# Patient Record
Sex: FEMALE | Race: WHITE | NOT HISPANIC OR LATINO
[De-identification: names, ages, dates, MRNs, and addresses within clinical notes are randomized per-mention and may not be internally consistent; named-entity substitution may affect disease eponyms.]

---

## 2019-01-25 ENCOUNTER — RECORD ABSTRACTING (OUTPATIENT)
Age: 84
End: 2019-01-25

## 2019-01-29 ENCOUNTER — APPOINTMENT (OUTPATIENT)
Dept: GERIATRICS | Facility: CLINIC | Age: 84
End: 2019-01-29
Payer: MEDICARE

## 2019-01-29 VITALS
HEIGHT: 57 IN | OXYGEN SATURATION: 93 % | TEMPERATURE: 97.8 F | WEIGHT: 109 LBS | BODY MASS INDEX: 23.51 KG/M2 | SYSTOLIC BLOOD PRESSURE: 128 MMHG | HEART RATE: 73 BPM | DIASTOLIC BLOOD PRESSURE: 70 MMHG

## 2019-01-29 PROCEDURE — 99214 OFFICE O/P EST MOD 30 MIN: CPT

## 2019-01-29 NOTE — SOCIAL HISTORY
[No falls in past year] : Patient reported no falls in the past year [Fully functional (bathing, dressing, toileting, transferring, walking, feeding)] : Fully functional (bathing, dressing, toileting, transferring, walking, feeding) [Fully functional (using the telephone, shopping, preparing meals, housekeeping, doing laundry, using transportation,] : Fully functional and needs no help or supervision to perform IADLs (using the telephone, shopping, preparing meals, housekeeping, doing laundry, using transportation, managing medications and managing finances) [Walker] : walker [Smoke Detector] : smoke detector [Carbon Monoxide Detector] : carbon monoxide detector

## 2019-01-29 NOTE — PHYSICAL EXAM
[General Appearance - Alert] : alert [General Appearance - In No Acute Distress] : in no acute distress [Sclera] : the sclera and conjunctiva were normal [Extraocular Movements] : extraocular movements were intact [Normal Oral Mucosa] : normal oral mucosa [No Oral Pallor] : no oral pallor [No Oral Cyanosis] : no oral cyanosis [Outer Ear] : the ears and nose were normal in appearance [Oropharynx] : The oropharynx was normal [Neck Appearance] : the appearance of the neck was normal [Neck Cervical Mass (___cm)] : no neck mass was observed [Jugular Venous Distention Increased] : there was no jugular-venous distention [Thyroid Diffuse Enlargement] : the thyroid was not enlarged [Thyroid Nodule] : there were no palpable thyroid nodules [Auscultation Breath Sounds / Voice Sounds] : lungs were clear to auscultation bilaterally [Heart Rate And Rhythm] : heart rate was normal and rhythm regular [Heart Sounds] : normal S1 and S2 [Heart Sounds Gallop] : no gallops [Heart Sounds Pericardial Friction Rub] : no pericardial rub [Bowel Sounds] : normal bowel sounds [Abdomen Soft] : soft [Abdomen Tenderness] : non-tender [Abdomen Mass (___ Cm)] : no abdominal mass palpated [Abnormal Walk] : normal gait [Nail Clubbing] : no clubbing  or cyanosis of the fingernails [Musculoskeletal - Swelling] : no joint swelling seen [Motor Tone] : muscle strength and tone were normal [Skin Color & Pigmentation] : normal skin color and pigmentation [Skin Turgor] : normal skin turgor [] : no rash [Sensation] : the sensory exam was normal to light touch and pinprick [No Focal Deficits] : no focal deficits [FreeTextEntry1] : pt is completely alert

## 2019-01-29 NOTE — ASSESSMENT
[FreeTextEntry1] : repeat labs in 3 months\par renewed meds\par \par called fla - await news on pneumonia vaccine

## 2019-01-29 NOTE — HISTORY OF PRESENT ILLNESS
[1] : 2) Feeling down, depressed, or hopeless for several days [PHQ-2 Score ___] : PHQ-2 Score [unfilled] [FreeTextEntry1] : pt. [facility] home in [place]

## 2019-05-14 ENCOUNTER — RESULT REVIEW (OUTPATIENT)
Age: 84
End: 2019-05-14

## 2019-05-14 ENCOUNTER — APPOINTMENT (OUTPATIENT)
Dept: GERIATRICS | Facility: CLINIC | Age: 84
End: 2019-05-14
Payer: MEDICARE

## 2019-05-14 VITALS
HEIGHT: 57 IN | WEIGHT: 108 LBS | OXYGEN SATURATION: 96 % | BODY MASS INDEX: 23.3 KG/M2 | TEMPERATURE: 97.1 F | DIASTOLIC BLOOD PRESSURE: 78 MMHG | HEART RATE: 76 BPM | SYSTOLIC BLOOD PRESSURE: 126 MMHG

## 2019-05-14 PROCEDURE — 99214 OFFICE O/P EST MOD 30 MIN: CPT

## 2019-05-14 NOTE — ASSESSMENT
[FreeTextEntry1] : repeat labs in 3 months\par renewed meds\par \par called fla - await news on pneumonia vaccine\par \par 5/14/19\par left ear cleaned of cerumen\par check labs\par refer to pain/acupuncture drs

## 2019-05-14 NOTE — REVIEW OF SYSTEMS
[Loss Of Hearing] : hearing loss [Joint Pain] : joint pain [Arthralgias] : arthralgias [Difficulty Walking] : difficulty walking [Limb Weakness] : limb weakness [Limb Pain] : limb pain [FreeTextEntry8] : frequent urination [Negative] : Heme/Lymph

## 2019-05-14 NOTE — SOCIAL HISTORY
[Fully functional (bathing, dressing, toileting, transferring, walking, feeding)] : Fully functional (bathing, dressing, toileting, transferring, walking, feeding) [No falls in past year] : Patient reported no falls in the past year [Fully functional (using the telephone, shopping, preparing meals, housekeeping, doing laundry, using transportation,] : Fully functional and needs no help or supervision to perform IADLs (using the telephone, shopping, preparing meals, housekeeping, doing laundry, using transportation, managing medications and managing finances) [Walker] : walker

## 2019-05-14 NOTE — HISTORY OF PRESENT ILLNESS
[0] : 2) Feeling down, depressed, or hopeless: Not at all [FreeTextEntry1] : pt is 89 year old alert lady lives with daughter\par DM, htn\par pelvic fracture 4/2017\par \par some left groin pain -\par relief from tylenol\par \par reviewed all labs from october\par hba1c 5.9\par \par eats donuts\par does not check sugar\par \par 5/14/19\par pt has been well\par c/o lbp - radiates down left leg\par uses tylenol\par interested in acupuncture\par 99 on monday\par noncompliant with diet\par very alert\par \par

## 2019-05-14 NOTE — PHYSICAL EXAM
[General Appearance - In No Acute Distress] : in no acute distress [Sclera] : the sclera and conjunctiva were normal [General Appearance - Alert] : alert [No Oral Pallor] : no oral pallor [Extraocular Movements] : extraocular movements were intact [Normal Oral Mucosa] : normal oral mucosa [Outer Ear] : the ears and nose were normal in appearance [Oropharynx] : The oropharynx was normal [No Oral Cyanosis] : no oral cyanosis [Jugular Venous Distention Increased] : there was no jugular-venous distention [Neck Appearance] : the appearance of the neck was normal [Neck Cervical Mass (___cm)] : no neck mass was observed [Thyroid Diffuse Enlargement] : the thyroid was not enlarged [Thyroid Nodule] : there were no palpable thyroid nodules [Auscultation Breath Sounds / Voice Sounds] : lungs were clear to auscultation bilaterally [Heart Sounds Gallop] : no gallops [Heart Rate And Rhythm] : heart rate was normal and rhythm regular [Heart Sounds] : normal S1 and S2 [Heart Sounds Pericardial Friction Rub] : no pericardial rub [Edema] : there was no peripheral edema [Abdomen Soft] : soft [Bowel Sounds] : normal bowel sounds [Abdomen Tenderness] : non-tender [Abdomen Mass (___ Cm)] : no abdominal mass palpated [Cervical Lymph Nodes Enlarged Anterior Bilaterally] : anterior cervical [Skin Color & Pigmentation] : normal skin color and pigmentation [Involuntary Movements] : no involuntary movements were seen [] : no rash [Skin Turgor] : normal skin turgor [No Focal Deficits] : no focal deficits [FreeTextEntry1] : pt is completely alert [Sensation] : the sensory exam was normal to light touch and pinprick

## 2019-09-17 ENCOUNTER — APPOINTMENT (OUTPATIENT)
Dept: GERIATRICS | Facility: CLINIC | Age: 84
End: 2019-09-17
Payer: MEDICARE

## 2019-09-17 VITALS
BODY MASS INDEX: 23 KG/M2 | DIASTOLIC BLOOD PRESSURE: 80 MMHG | HEIGHT: 57 IN | SYSTOLIC BLOOD PRESSURE: 130 MMHG | HEART RATE: 71 BPM | WEIGHT: 106.6 LBS | OXYGEN SATURATION: 96 %

## 2019-09-17 PROCEDURE — 69210 REMOVE IMPACTED EAR WAX UNI: CPT

## 2019-09-17 PROCEDURE — 99214 OFFICE O/P EST MOD 30 MIN: CPT

## 2019-09-17 PROCEDURE — 11719 TRIM NAIL(S) ANY NUMBER: CPT

## 2019-09-17 NOTE — HISTORY OF PRESENT ILLNESS
[FreeTextEntry1] : pt is 89 year old alert lady lives with daughter\par DM, htn\par pelvic fracture 4/2017\par \par some left groin pain -\par relief from tylenol\par \par reviewed all labs from october\par hba1c 5.9\par \par eats donuts\par does not check sugar\par \par 5/14/19\par pt has been well\par c/o lbp - radiates down left leg\par uses tylenol\par interested in acupuncture\par 99 on monday\par noncompliant with diet\par very alert\par \par 9/17/19\par pain left leg radiates to lower back\par stiff, tingling hands - fingers lock\par pt wakes at 2 am tylenol worn off\par take 2 bid - urged to increase\par saw Dr Gordillo\par He ordered MR L spine\par left hip xray\par grand daughter PT

## 2019-09-17 NOTE — PHYSICAL EXAM
[General Appearance - Alert] : alert [General Appearance - In No Acute Distress] : in no acute distress [Sclera] : the sclera and conjunctiva were normal [Both Tympanic Membranes Were Examined] : both tympanic membranes were normal [Extraocular Movements] : extraocular movements were intact [Neck Appearance] : the appearance of the neck was normal [Neck Cervical Mass (___cm)] : no neck mass was observed [Jugular Venous Distention Increased] : there was no jugular-venous distention [Thyroid Diffuse Enlargement] : the thyroid was not enlarged [Thyroid Nodule] : there were no palpable thyroid nodules [Auscultation Breath Sounds / Voice Sounds] : lungs were clear to auscultation bilaterally [Heart Rate And Rhythm] : heart rate was normal and rhythm regular [Heart Sounds Gallop] : no gallops [Heart Sounds] : normal S1 and S2 [Heart Sounds Pericardial Friction Rub] : no pericardial rub [Edema] : there was no peripheral edema [Bowel Sounds] : normal bowel sounds [Abdomen Soft] : soft [Abdomen Mass (___ Cm)] : no abdominal mass palpated [Abdomen Tenderness] : non-tender [Cervical Lymph Nodes Enlarged Anterior Bilaterally] : anterior cervical [Involuntary Movements] : no involuntary movements were seen [Skin Turgor] : normal skin turgor [Skin Color & Pigmentation] : normal skin color and pigmentation [] : no rash [Sensation] : the sensory exam was normal to light touch and pinprick [No Focal Deficits] : no focal deficits [FreeTextEntry1] : using walker, OA hands, antalgiic gait

## 2019-09-17 NOTE — REVIEW OF SYSTEMS
[Loss Of Hearing] : hearing loss [Arthralgias] : arthralgias [Joint Pain] : joint pain [Difficulty Walking] : difficulty walking [Negative] : Cardiovascular

## 2019-09-17 NOTE — ASSESSMENT
[FreeTextEntry1] : repeat labs in 3 months\par renewed meds\par \par called fla - await news on pneumonia vaccine\par \par 5/14/19\par left ear cleaned of cerumen\par check labs\par refer to pain/acupuncture drs\par \par 9/17/19\par pt going for MRI spine and hip films today\par from Dr Fernandez\par encouraged increase tylenol to 1000 mg tid\par labs fine in may - no need to repeat today\par hand exercises\par lives with grand daughter - a PT\par clipped toe nails\par cleaned ears\par gave flu vaccine

## 2019-11-25 ENCOUNTER — APPOINTMENT (OUTPATIENT)
Dept: GERIATRICS | Facility: CLINIC | Age: 84
End: 2019-11-25
Payer: MEDICARE

## 2019-11-25 ENCOUNTER — RESULT REVIEW (OUTPATIENT)
Age: 84
End: 2019-11-25

## 2019-11-25 VITALS — TEMPERATURE: 97.9 F | BODY MASS INDEX: 22.07 KG/M2 | OXYGEN SATURATION: 93 % | WEIGHT: 102 LBS | HEART RATE: 78 BPM

## 2019-11-25 VITALS — DIASTOLIC BLOOD PRESSURE: 68 MMHG | SYSTOLIC BLOOD PRESSURE: 130 MMHG

## 2019-11-25 PROCEDURE — 99214 OFFICE O/P EST MOD 30 MIN: CPT

## 2019-11-25 RX ORDER — ACETAMINOPHEN 500 MG/1
500 TABLET, COATED ORAL TWICE DAILY
Refills: 0 | Status: ACTIVE | COMMUNITY
Start: 2019-11-25

## 2019-11-25 NOTE — HEALTH RISK ASSESSMENT
[Intercurrent Urgi Care visits] : went to urgent care [No] : In the past 12 months have you used drugs other than those required for medical reasons? No [One fall no injury in past year] : Patient reported one fall in the past year without injury [0] : 2) Feeling down, depressed, or hopeless: Not at all (0) [With Family] : lives with family [[id number]] : 0 [] : No [de-identified] : Needs assistance [de-identified] : Needs assistance

## 2019-11-25 NOTE — PHYSICAL EXAM
[No Acute Distress] : no acute distress [Well-Appearing] : well-appearing [Normal Sclera/Conjunctiva] : normal sclera/conjunctiva [PERRL] : pupils equal round and reactive to light [EOMI] : extraocular movements intact [Normal Outer Ear/Nose] : the outer ears and nose were normal in appearance [Normal Oropharynx] : the oropharynx was normal [No JVD] : no jugular venous distention [No Lymphadenopathy] : no lymphadenopathy [Supple] : supple [No Respiratory Distress] : no respiratory distress  [No Accessory Muscle Use] : no accessory muscle use [Clear to Auscultation] : lungs were clear to auscultation bilaterally [Normal Rate] : normal rate  [Regular Rhythm] : with a regular rhythm [Normal S1, S2] : normal S1 and S2 [No Abdominal Bruit] : a ~M bruit was not heard ~T in the abdomen [No Edema] : there was no peripheral edema [Soft] : abdomen soft [Non Tender] : non-tender [Non-distended] : non-distended [Normal Posterior Cervical Nodes] : no posterior cervical lymphadenopathy [Normal Bowel Sounds] : normal bowel sounds [No Spinal Tenderness] : no spinal tenderness [Normal Anterior Cervical Nodes] : no anterior cervical lymphadenopathy [No CVA Tenderness] : no CVA  tenderness [No Joint Swelling] : no joint swelling [No Focal Deficits] : no focal deficits [Coordination Grossly Intact] : coordination grossly intact [No Rash] : no rash [Normal Affect] : the affect was normal [Speech Grossly Normal] : speech grossly normal [Normal Mood] : the mood was normal [de-identified] : Poor dentition, Left TM unable to visualize secondary to cerumen, right TM normal [de-identified] : 3/6 murmur

## 2019-11-25 NOTE — ASSESSMENT
[FreeTextEntry1] : Fluttering sensation of heart: Regular rhythm with no ectopic beats on exam today.  Referral for cardiology for zio patch.  Check labs to r/o electrolyte abnormalities and thyroid dysfunction.  \par \par HTN: BP acceptable today, continue with metoprolol ER 25 mg daily.\par \par DM: Patient does not check BG at home.  She eats small quantities, advised grand daughter to check BG if episodes reoccur as she is on glipizide 5 mg daily.  Last Cr 1.1 / GFR 46, can consider metformin for safety.  \par \par Follow up as scheduled with PCP.

## 2019-11-25 NOTE — ADDENDUM
[FreeTextEntry1] : 1955 Labs returned demonstrating Hgb 7.7 and mild SILVANO.  Spoke with grand daughter, advised to bring to the ED for further evaluation and treatment.

## 2019-11-25 NOTE — HISTORY OF PRESENT ILLNESS
[Discharge Summary] : discharge summary [Radiology Findings] : radiology findings [Med Reconciliation] : medication reconciliation has been completed [Discharge Med List] : discharge medication list [FreeTextEntry2] : 89 year old female with a history of HTN, HLD, T2DM who presents today for a post discharge visit.  She is accompanied by he grandchildren.  \par \par Last week patient complained of having a funny sensation / fluttering heartbeat in her chest on Wed.  Thursday this once again occurred however patient fell during this.  There was no LOC.  She was taken to [facility] for evaluation.  XR or left hip and knee obtained, negative for any fractures.  Patient denies any chest pressure, SOB, dizziness or syncope when these episodes occurred.  \par

## 2019-11-25 NOTE — REVIEW OF SYSTEMS
[Hearing Loss] : hearing loss [Palpitations] : palpitations [Back Pain] : back pain [Negative] : Heme/Lymph [Chest Pain] : no chest pain [Lower Ext Edema] : no lower extremity edema [Shortness Of Breath] : no shortness of breath [Dyspnea on Exertion] : no dyspnea on exertion [Joint Pain] : no joint pain

## 2019-12-03 ENCOUNTER — APPOINTMENT (OUTPATIENT)
Dept: GERIATRICS | Facility: CLINIC | Age: 84
End: 2019-12-03
Payer: MEDICARE

## 2019-12-03 ENCOUNTER — RESULT REVIEW (OUTPATIENT)
Age: 84
End: 2019-12-03

## 2019-12-03 VITALS — DIASTOLIC BLOOD PRESSURE: 60 MMHG | SYSTOLIC BLOOD PRESSURE: 130 MMHG

## 2019-12-03 VITALS
SYSTOLIC BLOOD PRESSURE: 170 MMHG | HEART RATE: 63 BPM | OXYGEN SATURATION: 95 % | WEIGHT: 104 LBS | TEMPERATURE: 97.9 F | BODY MASS INDEX: 22.51 KG/M2 | DIASTOLIC BLOOD PRESSURE: 70 MMHG

## 2019-12-03 PROCEDURE — 99214 OFFICE O/P EST MOD 30 MIN: CPT

## 2019-12-03 RX ORDER — GLIPIZIDE 5 MG/1
5 TABLET ORAL DAILY
Qty: 90 | Refills: 3 | Status: DISCONTINUED | COMMUNITY
End: 2019-12-03

## 2019-12-03 RX ORDER — PANTOPRAZOLE 40 MG/1
40 TABLET, DELAYED RELEASE ORAL DAILY
Qty: 90 | Refills: 3 | Status: DISCONTINUED | COMMUNITY
Start: 2019-12-03 | End: 2019-12-03

## 2019-12-03 RX ORDER — ASPIRIN 81 MG
81 TABLET, DELAYED RELEASE (ENTERIC COATED) ORAL DAILY
Refills: 0 | Status: DISCONTINUED | COMMUNITY
End: 2019-12-03

## 2019-12-03 NOTE — PHYSICAL EXAM
[General Appearance - Alert] : alert [General Appearance - In No Acute Distress] : in no acute distress [Auscultation Breath Sounds / Voice Sounds] : lungs were clear to auscultation bilaterally [Heart Sounds] : normal S1 and S2 [Heart Rate And Rhythm] : heart rate was normal and rhythm regular [Heart Sounds Gallop] : no gallops [Bowel Sounds] : normal bowel sounds [Heart Sounds Pericardial Friction Rub] : no pericardial rub [Abdomen Soft] : soft [Abdomen Tenderness] : non-tender [Abdomen Mass (___ Cm)] : no abdominal mass palpated [Skin Color & Pigmentation] : normal skin color and pigmentation [Involuntary Movements] : no involuntary movements were seen [Skin Turgor] : normal skin turgor [Sensation] : the sensory exam was normal to light touch and pinprick [] : no rash [No Focal Deficits] : no focal deficits [FreeTextEntry1] : pt is completely alert

## 2019-12-03 NOTE — ASSESSMENT
[FreeTextEntry1] : repeat labs in 3 months\par renewed meds\par \par called fla - await news on pneumonia vaccine\par \par 5/14/19\par left ear cleaned of cerumen\par check labs\par refer to pain/acupuncture drs\par \par 9/17/19\par pt going for MRI spine and hip films today\par from Dr Fernandez\par encouraged increase tylenol to 1000 mg tid\par labs fine in may - no need to repeat today\par hand exercises\par lives with grand daughter - a PT\par clipped toe nails\par cleaned ears\par gave flu vaccine\par \par 12/3/19\par avoid nsaids\par repeat labs\par cont pantaprozole\par f/up 2 weeks

## 2019-12-03 NOTE — SOCIAL HISTORY
[No falls in past year] : Patient reported no falls in the past year [Fully functional (using the telephone, shopping, preparing meals, housekeeping, doing laundry, using transportation,] : Fully functional and needs no help or supervision to perform IADLs (using the telephone, shopping, preparing meals, housekeeping, doing laundry, using transportation, managing medications and managing finances) [Fully functional (bathing, dressing, toileting, transferring, walking, feeding)] : Fully functional (bathing, dressing, toileting, transferring, walking, feeding) [Smoke Detector] : smoke detector [Carbon Monoxide Detector] : carbon monoxide detector [Driving] : not driving

## 2019-12-03 NOTE — HISTORY OF PRESENT ILLNESS
[PHQ-2 Score ___] : PHQ-2 Score [unfilled] [0] : 2) Feeling down, depressed, or hopeless: Not at all [FreeTextEntry1] : pt is 89 year old alert lady lives with daughter\par DM, htn\par pelvic fracture 4/2017\par \par some left groin pain -\par relief from tylenol\par \par reviewed all labs from october\par hba1c 5.9\par \par eats donuts\par does not check sugar\par \par 5/14/19\par pt has been well\par c/o lbp - radiates down left leg\par uses tylenol\par interested in acupuncture\par 99 on monday\par noncompliant with diet\par very alert\par \par 9/17/19\par pain left leg radiates to lower back\par stiff, tingling hands - fingers lock\par pt wakes at 2 am tylenol worn off\par take 2 bid - urged to increase\par saw Dr Gordillo\par He ordered MR L spine\par left hip xray\par grand daughter PT\par \par 12/3/19\par pt had been feeling unwell - had some chest pain\par stumbled ?fell - went to urgent care - then came here to f/up\par came in for urgent appt - found to have hb 7.7\par came to ER - got a unit of blood - blood in stool\par prob UGI bleed\par ? re endoscopy \par did not have it done\par gave her pantaprozole\par \par off glipizde and asa\par \par no abd pain\par one black stool\par now they are brown again\par no more chest pain\par

## 2019-12-17 ENCOUNTER — RESULT REVIEW (OUTPATIENT)
Age: 84
End: 2019-12-17

## 2019-12-17 ENCOUNTER — APPOINTMENT (OUTPATIENT)
Dept: GERIATRICS | Facility: CLINIC | Age: 84
End: 2019-12-17
Payer: MEDICARE

## 2019-12-17 VITALS
SYSTOLIC BLOOD PRESSURE: 140 MMHG | TEMPERATURE: 97.4 F | WEIGHT: 103 LBS | BODY MASS INDEX: 22.22 KG/M2 | DIASTOLIC BLOOD PRESSURE: 90 MMHG | HEART RATE: 64 BPM | HEIGHT: 57 IN

## 2019-12-17 PROCEDURE — 99214 OFFICE O/P EST MOD 30 MIN: CPT

## 2019-12-17 NOTE — HISTORY OF PRESENT ILLNESS
[0] : 2) Feeling down, depressed, or hopeless: Not at all [PHQ-2 Score ___] : PHQ-2 Score [unfilled] [FreeTextEntry1] : pt is 89 year old alert lady lives with daughter\par DM, htn\par pelvic fracture 4/2017\par \par some left groin pain -\par relief from tylenol\par \par reviewed all labs from october\par hba1c 5.9\par \par eats donuts\par does not check sugar\par \par 5/14/19\par pt has been well\par c/o lbp - radiates down left leg\par uses tylenol\par interested in acupuncture\par 99 on monday\par noncompliant with diet\par very alert\par \par 9/17/19\par pain left leg radiates to lower back\par stiff, tingling hands - fingers lock\par pt wakes at 2 am tylenol worn off\par take 2 bid - urged to increase\par saw Dr Gordillo\par He ordered MR L spine\par left hip xray\par grand daughter PT\par \par 12/3/19\par pt had been feeling unwell - had some chest pain\par stumbled ?fell - went to urgent care - then came here to f/up\par came in for urgent appt - found to have hb 7.7\par came to ER - got a unit of blood - blood in stool\par prob UGI bleed\par ? re endoscopy \par did not have it done\par gave her pantaprozole\par \par off glipizde and asa\par \par no abd pain\par one black stool\par now they are brown again\par no more chest pain\par \par 12/17/19\par knee and back pain\par no bleeding\par got PT\par \par hit corner of dressor to rt ribs\par

## 2019-12-17 NOTE — ASSESSMENT
[FreeTextEntry1] : repeat labs in 3 months\par renewed meds\par \par called fla - await news on pneumonia vaccine\par \par 5/14/19\par left ear cleaned of cerumen\par check labs\par refer to pain/acupuncture drs\par \par 9/17/19\par pt going for MRI spine and hip films today\par from Dr Fernandez\par encouraged increase tylenol to 1000 mg tid\par labs fine in may - no need to repeat today\par hand exercises\par lives with grand daughter - a PT\par clipped toe nails\par cleaned ears\par gave flu vaccine\par \par 12/3/19\par avoid nsaids\par repeat labs\par cont pantaprozole\par f/up 2 weeks\par \par 12/17/19\par cont off DM meds\par recheck labs\par f.up feb if stable

## 2019-12-17 NOTE — REVIEW OF SYSTEMS
[Arthralgias] : arthralgias [Loss Of Hearing] : hearing loss [Negative] : Psychiatric [FreeTextEntry3] : missing teeth [FreeTextEntry7] : no bloody or black stool

## 2019-12-17 NOTE — SOCIAL HISTORY
[No falls in past year] : Patient reported no falls in the past year [Fully functional (bathing, dressing, toileting, transferring, walking, feeding)] : Fully functional (bathing, dressing, toileting, transferring, walking, feeding) [Some assistance needed] : preparing meals [Full assistance needed] : managing finances [Smoke Detector] : smoke detector [Safety elements used in home] : safety elements used in home [Carbon Monoxide Detector] : carbon monoxide detector [Grab Bars] : no grab bars [Shower Chair] : no shower chair [Anti-Slip Measures] : no anti-slip measures [Driving] : not driving [Night Light] : no night light

## 2019-12-17 NOTE — PHYSICAL EXAM
[General Appearance - Alert] : alert [General Appearance - In No Acute Distress] : in no acute distress [General Appearance - Well Nourished] : well nourished [General Appearance - Well-Appearing] : healthy appearing [General Appearance - Well Developed] : well developed [Sclera] : the sclera and conjunctiva were normal [Extraocular Movements] : extraocular movements were intact [Normal Oral Mucosa] : normal oral mucosa [No Oral Cyanosis] : no oral cyanosis [No Oral Pallor] : no oral pallor [Outer Ear] : the ears and nose were normal in appearance [Oropharynx] : The oropharynx was normal [Neck Appearance] : the appearance of the neck was normal [Neck Cervical Mass (___cm)] : no neck mass was observed [Jugular Venous Distention Increased] : there was no jugular-venous distention [] : no respiratory distress [Auscultation Breath Sounds / Voice Sounds] : lungs were clear to auscultation bilaterally [Heart Sounds] : normal S1 and S2 [Heart Sounds Gallop] : no gallops [Heart Rate And Rhythm] : heart rate was normal and rhythm regular [Heart Sounds Pericardial Friction Rub] : no pericardial rub [FreeTextEntry1] : trace LE edema

## 2020-02-25 ENCOUNTER — RESULT REVIEW (OUTPATIENT)
Age: 85
End: 2020-02-25

## 2020-02-25 ENCOUNTER — APPOINTMENT (OUTPATIENT)
Dept: GERIATRICS | Facility: CLINIC | Age: 85
End: 2020-02-25
Payer: MEDICARE

## 2020-02-25 VITALS
HEART RATE: 83 BPM | DIASTOLIC BLOOD PRESSURE: 90 MMHG | SYSTOLIC BLOOD PRESSURE: 150 MMHG | TEMPERATURE: 98.2 F | BODY MASS INDEX: 21.86 KG/M2 | WEIGHT: 101 LBS | OXYGEN SATURATION: 95 %

## 2020-02-25 DIAGNOSIS — Z78.9 OTHER SPECIFIED HEALTH STATUS: ICD-10-CM

## 2020-02-25 PROCEDURE — 99214 OFFICE O/P EST MOD 30 MIN: CPT

## 2020-02-25 NOTE — ASSESSMENT
[FreeTextEntry1] : repeat labs in 3 months\par renewed meds\par \par called fla - await news on pneumonia vaccine\par \par 5/14/19\par left ear cleaned of cerumen\par check labs\par refer to pain/acupuncture drs\par \par 9/17/19\par pt going for MRI spine and hip films today\par from Dr Fernandez\par encouraged increase tylenol to 1000 mg tid\par labs fine in may - no need to repeat today\par hand exercises\par lives with grand daughter - a PT\par clipped toe nails\par cleaned ears\par gave flu vaccine\par \par 12/3/19\par avoid nsaids\par repeat labs\par cont pantaprozole\par f/up 2 weeks\par \par 12/17/19\par cont off DM meds\par recheck labs\par f.up feb if stable\par \par 2/25/20\par checking labs today\par pain management for back

## 2020-02-25 NOTE — HISTORY OF PRESENT ILLNESS
[0] : 1) Little interest or pleasure doing things: Not at all [2] : 2) Feeling down, depressed, or hopeless for more than half of the days [FreeTextEntry1] : pt is 89 year old alert lady lives with daughter\par DM, htn\par pelvic fracture 4/2017\par \par some left groin pain -\par relief from tylenol\par \par reviewed all labs from october\par hba1c 5.9\par \par eats donuts\par does not check sugar\par \par 5/14/19\par pt has been well\par c/o lbp - radiates down left leg\par uses tylenol\par interested in acupuncture\par 99 on monday\par noncompliant with diet\par very alert\par \par 9/17/19\par pain left leg radiates to lower back\par stiff, tingling hands - fingers lock\par pt wakes at 2 am tylenol worn off\par take 2 bid - urged to increase\par saw Dr Gordillo\par He ordered MR L spine\par left hip xray\par grand daughter PT\par \par 12/3/19\par pt had been feeling unwell - had some chest pain\par stumbled ?fell - went to urgent care - then came here to f/up\par came in for urgent appt - found to have hb 7.7\par came to ER - got a unit of blood - blood in stool\par prob UGI bleed\par ? re endoscopy \par did not have it done\par gave her pantaprozole\par \par off glipizde and asa\par \par no abd pain\par one black stool\par now they are brown again\par no more chest pain\par \par 12/17/19\par knee and back pain\par no bleeding\par got PT\par \par hit corner of dressor to rt ribs\par \par 2/25/20\par takes ulcer meds\par can have tomato sauce if  no pain\par off DM meds\par no blood in stool\par left leg hurts\par low back pain

## 2020-02-25 NOTE — PHYSICAL EXAM
[General Appearance - Alert] : alert [General Appearance - In No Acute Distress] : in no acute distress [General Appearance - Well Nourished] : well nourished [General Appearance - Well Developed] : well developed [Sclera] : the sclera and conjunctiva were normal [General Appearance - Well-Appearing] : healthy appearing [Extraocular Movements] : extraocular movements were intact [Normal Oral Mucosa] : normal oral mucosa [No Oral Pallor] : no oral pallor [No Oral Cyanosis] : no oral cyanosis [Outer Ear] : the ears and nose were normal in appearance [Neck Appearance] : the appearance of the neck was normal [Oropharynx] : The oropharynx was normal [Neck Cervical Mass (___cm)] : no neck mass was observed [Jugular Venous Distention Increased] : there was no jugular-venous distention [Auscultation Breath Sounds / Voice Sounds] : lungs were clear to auscultation bilaterally [Heart Rate And Rhythm] : heart rate was normal and rhythm regular [Heart Sounds] : normal S1 and S2 [Heart Sounds Gallop] : no gallops [Heart Sounds Pericardial Friction Rub] : no pericardial rub [Bowel Sounds] : normal bowel sounds [Abdomen Soft] : soft [Abdomen Tenderness] : non-tender [Abdomen Mass (___ Cm)] : no abdominal mass palpated [Involuntary Movements] : no involuntary movements were seen [Cervical Lymph Nodes Enlarged Anterior Bilaterally] : anterior cervical [FreeTextEntry1] : using walker, OA hands, antalgiic gait - in wheel chair [Skin Turgor] : normal skin turgor [Skin Color & Pigmentation] : normal skin color and pigmentation [] : no rash

## 2020-02-25 NOTE — SOCIAL HISTORY
[Any fall with injury in past year] : Patient reported fall with injury in the past year [de-identified] : pt. fell on 02/23/2020 and hit her head on the shower door. pt stated "it did not hurt"

## 2020-02-27 ENCOUNTER — RX RENEWAL (OUTPATIENT)
Age: 85
End: 2020-02-27

## 2020-04-28 ENCOUNTER — APPOINTMENT (OUTPATIENT)
Dept: GERIATRICS | Facility: CLINIC | Age: 85
End: 2020-04-28
Payer: MEDICARE

## 2020-04-28 PROCEDURE — 99447 NTRPROF PH1/NTRNET/EHR 11-20: CPT

## 2020-04-28 NOTE — HISTORY OF PRESENT ILLNESS
[FreeTextEntry1] : pt is 89 year old alert lady lives with daughter\par DM, htn\par pelvic fracture 4/2017\par \par some left groin pain -\par relief from tylenol\par \par reviewed all labs from october\par hba1c 5.9\par \par eats donuts\par does not check sugar\par \par 5/14/19\par pt has been well\par c/o lbp - radiates down left leg\par uses tylenol\par interested in acupuncture\par 99 on monday\par noncompliant with diet\par very alert\par \par 9/17/19\par pain left leg radiates to lower back\par stiff, tingling hands - fingers lock\par pt wakes at 2 am tylenol worn off\par take 2 bid - urged to increase\par saw Dr Gordillo\par He ordered MR L spine\par left hip xray\par grand daughter PT\par \par 12/3/19\par pt had been feeling unwell - had some chest pain\par stumbled ?fell - went to urgent care - then came here to f/up\par came in for urgent appt - found to have hb 7.7\par came to ER - got a unit of blood - blood in stool\par prob UGI bleed\par ? re endoscopy \par did not have it done\par gave her pantaprozole\par \par off glipizde and asa\par \par no abd pain\par one black stool\par now they are brown again\par no more chest pain\par \par 12/17/19\par knee and back pain\par no bleeding\par got PT\par \par hit corner of dressor to rt ribs\par \par 2/25/20\par takes ulcer meds\par can have tomato sauce if  no pain\par off DM meds\par no blood in stool\par left leg hurts\par low back pain\par \par 4/28/20\par \par called [phone]\par back/hip pains\par in in [facility] with family bc of covid\par likes cakes\par \par one day was weak - fsg - after eating donut - 184\par paramedics checked her and she was ok\par \par BP checked once - will do again and send me

## 2020-07-01 ENCOUNTER — RX RENEWAL (OUTPATIENT)
Age: 85
End: 2020-07-01

## 2020-10-05 ENCOUNTER — RX RENEWAL (OUTPATIENT)
Age: 85
End: 2020-10-05

## 2020-10-15 ENCOUNTER — RX RENEWAL (OUTPATIENT)
Age: 85
End: 2020-10-15

## 2020-12-29 ENCOUNTER — RX RENEWAL (OUTPATIENT)
Age: 85
End: 2020-12-29

## 2021-05-18 ENCOUNTER — APPOINTMENT (OUTPATIENT)
Dept: GERIATRICS | Facility: CLINIC | Age: 86
End: 2021-05-18
Payer: MEDICARE

## 2021-05-18 VITALS
OXYGEN SATURATION: 98 % | WEIGHT: 95 LBS | DIASTOLIC BLOOD PRESSURE: 70 MMHG | BODY MASS INDEX: 20.56 KG/M2 | HEART RATE: 62 BPM | TEMPERATURE: 98 F | SYSTOLIC BLOOD PRESSURE: 130 MMHG

## 2021-05-18 VITALS — SYSTOLIC BLOOD PRESSURE: 150 MMHG | DIASTOLIC BLOOD PRESSURE: 80 MMHG

## 2021-05-18 PROCEDURE — 99214 OFFICE O/P EST MOD 30 MIN: CPT

## 2021-05-18 NOTE — HISTORY OF PRESENT ILLNESS
[0] : 0 [Compliant with medications] : compliant with medications [Needs some help with ADLs] : need some help with ADLs [Does not drive] : does not drive [No history of falls] : no history of falls [Smoke Detectors] : smoke detectors [Carbon Monoxide Detector] : carbon monoxide detector [Over the Past 2 Weeks, Have You Felt Down, Depressed, or Hopeless?] : 1.) Over the past 2 weeks, have you felt down, depressed, or hopeless? No [Over the Past 2 Weeks, Have You Felt Little Interest or Pleasure Doing Things?] : 2.) Over the past 2 weeks, have you felt little interest or pleasure doing things? No [Bathroom Grab Bars] : not using bathroom grab bars [Fall Prevention Measures] : not using fall prevention measures [FreeTextEntry1] : pt is 89 year old alert lady lives with daughter\par DM, htn\par pelvic fracture 4/2017\par \par some left groin pain -\par relief from tylenol\par \par reviewed all labs from october\par hba1c 5.9\par \par eats donuts\par does not check sugar\par \par 5/14/19\par pt has been well\par c/o lbp - radiates down left leg\par uses tylenol\par interested in acupuncture\par 99 on monday\par noncompliant with diet\par very alert\par \par 9/17/19\par pain left leg radiates to lower back\par stiff, tingling hands - fingers lock\par pt wakes at 2 am tylenol worn off\par take 2 bid - urged to increase\par saw Dr Gordillo\par He ordered MR L spine\par left hip xray\par grand daughter PT\par \par 12/3/19\par pt had been feeling unwell - had some chest pain\par stumbled ?fell - went to urgent care - then came here to f/up\par came in for urgent appt - found to have hb 7.7\par came to ER - got a unit of blood - blood in stool\par prob UGI bleed\par ? re endoscopy \par did not have it done\par gave her pantaprozole\par \par off glipizde and asa\par \par no abd pain\par one black stool\par now they are brown again\par no more chest pain\par \par 12/17/19\par knee and back pain\par no bleeding\par got PT\par \par hit corner of dressor to rt ribs\par \par 2/25/20\par takes ulcer meds\par can have tomato sauce if  no pain\par off DM meds\par no blood in stool\par left leg hurts\par low back pain\par \par 4/28/20\par \par called [phone]\par back/hip pains\par in in [facility] with family bc of covid\par likes cakes\par \par one day was weak - fsg - after eating donut - 184\par paramedics checked her and she was ok\par \par BP checked once - will do again and send me \par \par 5/18/21\par pt has been in [place] during covid\par first visit in a year\par vaccinated against covid in april \par was in ER 2 weeks ago in [place] - dehydrated - got bolus fluids\par

## 2021-05-18 NOTE — REVIEW OF SYSTEMS
[Loss Of Hearing] : hearing loss [Lower Ext Edema] : lower extremity edema [Confused] : confusion [Limb Weakness] : limb weakness [Difficulty Walking] : difficulty walking [Negative] : Integumentary [Chest Pain] : no chest pain [Palpitations] : no palpitations [FreeTextEntry3] : eyes hurt [FreeTextEntry5] : trace

## 2021-05-18 NOTE — PHYSICAL EXAM
[General Appearance - Alert] : alert [General Appearance - Well Nourished] : well nourished [General Appearance - In No Acute Distress] : in no acute distress [General Appearance - Well Developed] : well developed [General Appearance - Well-Appearing] : healthy appearing [Sclera] : the sclera and conjunctiva were normal [Extraocular Movements] : extraocular movements were intact [Normal Oral Mucosa] : normal oral mucosa [No Oral Pallor] : no oral pallor [No Oral Cyanosis] : no oral cyanosis [Outer Ear] : the ears and nose were normal in appearance [Oropharynx] : The oropharynx was normal [Neck Appearance] : the appearance of the neck was normal [Neck Cervical Mass (___cm)] : no neck mass was observed [Jugular Venous Distention Increased] : there was no jugular-venous distention [Auscultation Breath Sounds / Voice Sounds] : lungs were clear to auscultation bilaterally [Heart Sounds] : normal S1 and S2 [Heart Rate And Rhythm] : heart rate was normal and rhythm regular [Heart Sounds Gallop] : no gallops [Heart Sounds Pericardial Friction Rub] : no pericardial rub [Bowel Sounds] : normal bowel sounds [Abdomen Soft] : soft [Abdomen Tenderness] : non-tender [Abdomen Mass (___ Cm)] : no abdominal mass palpated [Cervical Lymph Nodes Enlarged Anterior Bilaterally] : anterior cervical [No CVA Tenderness] : no ~M costovertebral angle tenderness [No Spinal Tenderness] : no spinal tenderness [Involuntary Movements] : no involuntary movements were seen [Skin Color & Pigmentation] : normal skin color and pigmentation [Skin Turgor] : normal skin turgor [] : no rash [Sensation] : the sensory exam was normal to light touch and pinprick [No Focal Deficits] : no focal deficits [FreeTextEntry1] : pt is completely alert

## 2021-05-18 NOTE — ASSESSMENT
[FreeTextEntry1] : repeat labs in 3 months\par renewed meds\par \par called fla - await news on pneumonia vaccine\par \par 5/14/19\par left ear cleaned of cerumen\par check labs\par refer to pain/acupuncture drs\par \par 9/17/19\par pt going for MRI spine and hip films today\par from Dr Fernandez\par encouraged increase tylenol to 1000 mg tid\par labs fine in may - no need to repeat today\par hand exercises\par lives with grand daughter - a PT\par clipped toe nails\par cleaned ears\par gave flu vaccine\par \par 12/3/19\par avoid nsaids\par repeat labs\par cont pantaprozole\par f/up 2 weeks\par \par 12/17/19\par cont off DM meds\par recheck labs\par f.up feb if stable\par \par 2/25/20\par checking labs today\par pain management for back\par \par 5/18/21\par pt had episode of weakness - two weeks ago\par responded to IV fluids\par pt has loud systolic mumur - which is louder than last visit\par with sinus arrythmia\par will arrange cardiac eval

## 2021-05-24 ENCOUNTER — NON-APPOINTMENT (OUTPATIENT)
Age: 86
End: 2021-05-24

## 2021-05-24 DIAGNOSIS — K08.9 DISORDER OF TEETH AND SUPPORTING STRUCTURES, UNSPECIFIED: ICD-10-CM

## 2021-05-24 DIAGNOSIS — K92.2 GASTROINTESTINAL HEMORRHAGE, UNSPECIFIED: ICD-10-CM

## 2021-05-25 ENCOUNTER — APPOINTMENT (OUTPATIENT)
Dept: CARDIOLOGY | Facility: CLINIC | Age: 86
End: 2021-05-25
Payer: MEDICARE

## 2021-05-25 ENCOUNTER — NON-APPOINTMENT (OUTPATIENT)
Age: 86
End: 2021-05-25

## 2021-05-25 VITALS
WEIGHT: 93 LBS | HEIGHT: 57 IN | BODY MASS INDEX: 20.06 KG/M2 | DIASTOLIC BLOOD PRESSURE: 75 MMHG | HEART RATE: 66 BPM | SYSTOLIC BLOOD PRESSURE: 130 MMHG

## 2021-05-25 DIAGNOSIS — M16.10 UNILATERAL PRIMARY OSTEOARTHRITIS, UNSPECIFIED HIP: ICD-10-CM

## 2021-05-25 PROCEDURE — 93000 ELECTROCARDIOGRAM COMPLETE: CPT

## 2021-05-25 PROCEDURE — 99213 OFFICE O/P EST LOW 20 MIN: CPT

## 2021-05-25 NOTE — HISTORY OF PRESENT ILLNESS
[FreeTextEntry1] : This 89 year old female is referred by Dr Townsend for a heart murmur.\par She was under the care of Dr De Leon in [place], she does not know the reason\par \par .She was recently hospitalized in [place] May 2021  for increased confusion,  EMS could not find a blood pressure but in the er she was found to be hypertensive ( felt to be chronic) and dehydrated. A murmur was noted. She was monitored overnight. \par She denies chest pain, dyspnea, palpitations or syncope.\par She walks with a walker, transports in a wheelchair.\par

## 2021-06-17 ENCOUNTER — RX RENEWAL (OUTPATIENT)
Age: 86
End: 2021-06-17

## 2021-07-27 ENCOUNTER — APPOINTMENT (OUTPATIENT)
Dept: GERIATRICS | Facility: CLINIC | Age: 86
End: 2021-07-27
Payer: MEDICARE

## 2021-07-27 VITALS — SYSTOLIC BLOOD PRESSURE: 148 MMHG | HEART RATE: 68 BPM | OXYGEN SATURATION: 94 % | DIASTOLIC BLOOD PRESSURE: 72 MMHG

## 2021-07-27 VITALS — BODY MASS INDEX: 19.95 KG/M2 | WEIGHT: 92.2 LBS

## 2021-07-27 DIAGNOSIS — R01.1 CARDIAC MURMUR, UNSPECIFIED: ICD-10-CM

## 2021-07-27 PROCEDURE — 99213 OFFICE O/P EST LOW 20 MIN: CPT

## 2021-07-27 NOTE — PHYSICAL EXAM
[General Appearance - Alert] : alert [General Appearance - In No Acute Distress] : in no acute distress [General Appearance - Well Nourished] : well nourished [General Appearance - Well Developed] : well developed [General Appearance - Well-Appearing] : healthy appearing [Sclera] : the sclera and conjunctiva were normal [Extraocular Movements] : extraocular movements were intact [Normal Oral Mucosa] : normal oral mucosa [No Oral Pallor] : no oral pallor [No Oral Cyanosis] : no oral cyanosis [Outer Ear] : the ears and nose were normal in appearance [Oropharynx] : The oropharynx was normal [Neck Appearance] : the appearance of the neck was normal [Neck Cervical Mass (___cm)] : no neck mass was observed [Jugular Venous Distention Increased] : there was no jugular-venous distention [Auscultation Breath Sounds / Voice Sounds] : lungs were clear to auscultation bilaterally [Heart Rate And Rhythm] : heart rate was normal and rhythm regular [Heart Sounds] : normal S1 and S2 [Heart Sounds Gallop] : no gallops [Heart Sounds Pericardial Friction Rub] : no pericardial rub [Bowel Sounds] : normal bowel sounds [Abdomen Soft] : soft [Abdomen Tenderness] : non-tender [Abdomen Mass (___ Cm)] : no abdominal mass palpated [Cervical Lymph Nodes Enlarged Anterior Bilaterally] : anterior cervical [No CVA Tenderness] : no ~M costovertebral angle tenderness [No Spinal Tenderness] : no spinal tenderness [Involuntary Movements] : no involuntary movements were seen [Skin Color & Pigmentation] : normal skin color and pigmentation [Skin Turgor] : normal skin turgor [] : no rash [Sensation] : the sensory exam was normal to light touch and pinprick [No Focal Deficits] : no focal deficits [FreeTextEntry1] : pt is completely alert

## 2021-07-27 NOTE — HISTORY OF PRESENT ILLNESS
[One fall no injury in past year] : Patient reported one fall in the past year without injury [Full assistance needed] : managing finances [Walker] : walker [Other: ____] : [unfilled] [Smoke Detector] : smoke detector [Carbon Monoxide Detector] : carbon monoxide detector [Grab Bars] : grab bars [Shower Chair] : shower chair [FreeTextEntry1] : pt is 89 year old alert lady lives with daughter\par DM, htn\par pelvic fracture 4/2017\par \par some left groin pain -\par relief from tylenol\par \par reviewed all labs from october\par hba1c 5.9\par \par eats donuts\par does not check sugar\par \par 5/14/19\par pt has been well\par c/o lbp - radiates down left leg\par uses tylenol\par interested in acupuncture\par 99 on monday\par noncompliant with diet\par very alert\par \par 9/17/19\par pain left leg radiates to lower back\par stiff, tingling hands - fingers lock\par pt wakes at 2 am tylenol worn off\par take 2 bid - urged to increase\par saw Dr Gordillo\par He ordered MR L spine\par left hip xray\par grand daughter PT\par \par 12/3/19\par pt had been feeling unwell - had some chest pain\par stumbled ?fell - went to urgent care - then came here to f/up\par came in for urgent appt - found to have hb 7.7\par came to ER - got a unit of blood - blood in stool\par prob UGI bleed\par ? re endoscopy \par did not have it done\par gave her pantaprozole\par \par off glipizde and asa\par \par no abd pain\par one black stool\par now they are brown again\par no more chest pain\par \par 12/17/19\par knee and back pain\par no bleeding\par got PT\par \par hit corner of dressor to rt ribs\par \par 2/25/20\par takes ulcer meds\par can have tomato sauce if  no pain\par off DM meds\par no blood in stool\par left leg hurts\par low back pain\par \par 4/28/20\par \par called [phone]\par back/hip pains\par in in [facility] with family bc of covid\par likes cakes\par \par one day was weak - fsg - after eating donut - 184\par paramedics checked her and she was ok\par \par BP checked once - will do again and send me \par \par 5/18/21\par pt has been in [place] during covid\par first visit in a year\par vaccinated against covid in april \par was in ER 2 weeks ago in [place] - dehydrated - got bolus fluids\par \par 7/27/21\par pt has been well\par concern re labile BP\par got new cuff - correlates with our readings\par today 146/77 by her machine\par 148/72 by ours\par saw Dr Suggs - did not di echo to w/up systolic murmur\par no symptoms \par no cp ,sob, syncope\par walks with walker\par presently losartan 25 mg qam\par toprol 25  two in am and one in pm\par nifedipine ER 30 mg qhs\par \par last labs May - 2021\par \par pt is very alert\par uses walker\par  [Driving] : not driving

## 2021-07-27 NOTE — ASSESSMENT
[FreeTextEntry1] : repeat labs in 3 months\par renewed meds\par \par called fla - await news on pneumonia vaccine\par \par 5/14/19\par left ear cleaned of cerumen\par check labs\par refer to pain/acupuncture drs\par \par 9/17/19\par pt going for MRI spine and hip films today\par from Dr Fernandez\par encouraged increase tylenol to 1000 mg tid\par labs fine in may - no need to repeat today\par hand exercises\par lives with grand daughter - a PT\par clipped toe nails\par cleaned ears\par gave flu vaccine\par \par 12/3/19\par avoid nsaids\par repeat labs\par cont pantaprozole\par f/up 2 weeks\par \par 12/17/19\par cont off DM meds\par recheck labs\par f.up feb if stable\par \par 2/25/20\par checking labs today\par pain management for back\par \par 5/18/21\par pt had episode of weakness - two weeks ago\par responded to IV fluids\par pt has loud systolic mumur - which is louder than last visit\par with sinus arrythmia\par will arrange cardiac eval\par \par 7/27/21\par last few days BP in 140s\par will continue to monitor on same meds\par did not change today\par as going well with new cuff and meds

## 2021-07-27 NOTE — REVIEW OF SYSTEMS
[Recent Weight Loss (___ Lbs)] : recent [unfilled] ~Ulb weight loss [Loss Of Hearing] : hearing loss [Lower Ext Edema] : lower extremity edema [Arthralgias] : arthralgias [Negative] : Heme/Lymph

## 2021-08-16 ENCOUNTER — RX RENEWAL (OUTPATIENT)
Age: 86
End: 2021-08-16

## 2021-09-28 ENCOUNTER — APPOINTMENT (OUTPATIENT)
Dept: GERIATRICS | Facility: CLINIC | Age: 86
End: 2021-09-28
Payer: MEDICARE

## 2021-09-28 VITALS
TEMPERATURE: 97.5 F | HEIGHT: 57 IN | HEART RATE: 62 BPM | BODY MASS INDEX: 20.06 KG/M2 | WEIGHT: 93 LBS | OXYGEN SATURATION: 97 % | DIASTOLIC BLOOD PRESSURE: 88 MMHG | SYSTOLIC BLOOD PRESSURE: 140 MMHG

## 2021-09-28 PROCEDURE — 99214 OFFICE O/P EST MOD 30 MIN: CPT | Mod: 25

## 2021-09-28 PROCEDURE — 90662 IIV NO PRSV INCREASED AG IM: CPT

## 2021-09-28 PROCEDURE — G0008: CPT

## 2021-09-28 NOTE — HISTORY OF PRESENT ILLNESS
[One fall no injury in past year] : Patient reported one fall in the past year without injury [Walker] : walker [FreeTextEntry1] : pt is 89 year old alert lady lives with daughter\par DM, htn\par pelvic fracture 4/2017\par \par some left groin pain -\par relief from tylenol\par \par reviewed all labs from october\par hba1c 5.9\par \par eats donuts\par does not check sugar\par \par 5/14/19\par pt has been well\par c/o lbp - radiates down left leg\par uses tylenol\par interested in acupuncture\par 99 on monday\par noncompliant with diet\par very alert\par \par 9/17/19\par pain left leg radiates to lower back\par stiff, tingling hands - fingers lock\par pt wakes at 2 am tylenol worn off\par take 2 bid - urged to increase\par saw Dr Gordillo\par He ordered MR L spine\par left hip xray\par grand daughter PT\par \par 12/3/19\par pt had been feeling unwell - had some chest pain\par stumbled ?fell - went to urgent care - then came here to f/up\par came in for urgent appt - found to have hb 7.7\par came to ER - got a unit of blood - blood in stool\par prob UGI bleed\par ? re endoscopy \par did not have it done\par gave her pantaprozole\par \par off glipizde and asa\par \par no abd pain\par one black stool\par now they are brown again\par no more chest pain\par \par 12/17/19\par knee and back pain\par no bleeding\par got PT\par \par hit corner of dressor to rt ribs\par \par 2/25/20\par takes ulcer meds\par can have tomato sauce if  no pain\par off DM meds\par no blood in stool\par left leg hurts\par low back pain\par \par 4/28/20\par \par called [phone]\par back/hip pains\par in in [facility] with family bc of covid\par likes cakes\par \par one day was weak - fsg - after eating donut - 184\par paramedics checked her and she was ok\par \par BP checked once - will do again and send me \par \par 5/18/21\par pt has been in [place] during covid\par first visit in a year\par vaccinated against covid in april \par was in ER 2 weeks ago in [place] - dehydrated - got bolus fluids\par \par 7/27/21\par pt has been well\par concern re labile BP\par got new cuff - correlates with our readings\par today 146/77 by her machine\par 148/72 by ours\par saw Dr Suggs - did not di echo to w/up systolic murmur\par no symptoms \par no cp ,sob, syncope\par walks with walker\par presently losartan 25 mg qam\par toprol 25  two in am and one in pm\par nifedipine ER 30 mg qhs\par \par last labs May - 2021\par \par pt is very alert\par uses walker\par \par 9/28/21\par has been well\par needs labs\par flu vaccine\par \par BP has been ok\par once heart rate went to 40s\par no dizziness\par dizzy if gets up fast\par does not drink much fluids\par gatorade/water\par incontinence worsened\par continous malodorus urine\par wants to see urogyn -

## 2021-09-28 NOTE — ASSESSMENT
[FreeTextEntry1] : repeat labs in 3 months\par renewed meds\par \par called fla - await news on pneumonia vaccine\par \par 5/14/19\par left ear cleaned of cerumen\par check labs\par refer to pain/acupuncture drs\par \par 9/17/19\par pt going for MRI spine and hip films today\par from Dr Fernandez\par encouraged increase tylenol to 1000 mg tid\par labs fine in may - no need to repeat today\par hand exercises\par lives with grand daughter - a PT\par clipped toe nails\par cleaned ears\par gave flu vaccine\par \par 12/3/19\par avoid nsaids\par repeat labs\par cont pantaprozole\par f/up 2 weeks\par \par 12/17/19\par cont off DM meds\par recheck labs\par f.up feb if stable\par \par 2/25/20\par checking labs today\par pain management for back\par \par 5/18/21\par pt had episode of weakness - two weeks ago\par responded to IV fluids\par pt has loud systolic mumur - which is louder than last visit\par with sinus arrythmia\par will arrange cardiac eval\par \par 7/27/21\par last few days BP in 140s\par will continue to monitor on same meds\par did not change today\par as going well with new cuff and meds\par \par 9/28/21\par LE edema - dependent - needs to elevate\par lungs- clear\par BP better\par check labs\par flu vaccine     given\par cont same meds\par to get echo and cardiac check up\par

## 2021-09-28 NOTE — REVIEW OF SYSTEMS
[Recent Weight Loss (___ Lbs)] : recent [unfilled] ~Ulb weight loss [Loss Of Hearing] : hearing loss [Lower Ext Edema] : lower extremity edema [Arthralgias] : arthralgias [Negative] : Constitutional [FreeTextEntry5] : +1 ankle edema

## 2021-09-29 LAB
25(OH)D3 SERPL-MCNC: 21.1 NG/ML
ALBUMIN SERPL ELPH-MCNC: 4.2 G/DL
ALP BLD-CCNC: 93 U/L
ALT SERPL-CCNC: 13 U/L
ANION GAP SERPL CALC-SCNC: 13 MMOL/L
AST SERPL-CCNC: 16 U/L
BASOPHILS # BLD AUTO: 0.05 K/UL
BASOPHILS NFR BLD AUTO: 0.6 %
BILIRUB SERPL-MCNC: 0.4 MG/DL
BUN SERPL-MCNC: 26 MG/DL
CALCIUM SERPL-MCNC: 8.3 MG/DL
CHLORIDE SERPL-SCNC: 102 MMOL/L
CHOLEST SERPL-MCNC: 207 MG/DL
CO2 SERPL-SCNC: 27 MMOL/L
CREAT SERPL-MCNC: 1.13 MG/DL
EOSINOPHIL # BLD AUTO: 0.1 K/UL
EOSINOPHIL NFR BLD AUTO: 1.3 %
GLUCOSE SERPL-MCNC: 178 MG/DL
HCT VFR BLD CALC: 39.8 %
HDLC SERPL-MCNC: 53 MG/DL
HGB BLD-MCNC: 12.7 G/DL
IMM GRANULOCYTES NFR BLD AUTO: 0.4 %
LDLC SERPL CALC-MCNC: 112 MG/DL
LYMPHOCYTES # BLD AUTO: 2.71 K/UL
LYMPHOCYTES NFR BLD AUTO: 34.2 %
MAN DIFF?: NORMAL
MCHC RBC-ENTMCNC: 31.9 GM/DL
MCHC RBC-ENTMCNC: 33.5 PG
MCV RBC AUTO: 105 FL
MONOCYTES # BLD AUTO: 0.61 K/UL
MONOCYTES NFR BLD AUTO: 7.7 %
NEUTROPHILS # BLD AUTO: 4.42 K/UL
NEUTROPHILS NFR BLD AUTO: 55.8 %
NONHDLC SERPL-MCNC: 154 MG/DL
PLATELET # BLD AUTO: 294 K/UL
POTASSIUM SERPL-SCNC: 4.4 MMOL/L
PROT SERPL-MCNC: 6.6 G/DL
RBC # BLD: 3.79 M/UL
RBC # FLD: 14 %
SODIUM SERPL-SCNC: 141 MMOL/L
TRIGL SERPL-MCNC: 211 MG/DL
WBC # FLD AUTO: 7.92 K/UL

## 2021-09-30 ENCOUNTER — NON-APPOINTMENT (OUTPATIENT)
Age: 86
End: 2021-09-30

## 2021-09-30 LAB
ESTIMATED AVERAGE GLUCOSE: 143 MG/DL
HBA1C MFR BLD HPLC: 6.6 %

## 2021-10-26 ENCOUNTER — NON-APPOINTMENT (OUTPATIENT)
Age: 86
End: 2021-10-26

## 2021-11-11 ENCOUNTER — APPOINTMENT (OUTPATIENT)
Dept: CARDIOLOGY | Facility: CLINIC | Age: 86
End: 2021-11-11

## 2021-12-14 ENCOUNTER — APPOINTMENT (OUTPATIENT)
Dept: GERIATRICS | Facility: CLINIC | Age: 86
End: 2021-12-14
Payer: MEDICARE

## 2021-12-14 ENCOUNTER — NON-APPOINTMENT (OUTPATIENT)
Age: 86
End: 2021-12-14

## 2021-12-14 VITALS
BODY MASS INDEX: 20.93 KG/M2 | SYSTOLIC BLOOD PRESSURE: 130 MMHG | HEART RATE: 65 BPM | DIASTOLIC BLOOD PRESSURE: 58 MMHG | WEIGHT: 97 LBS | HEIGHT: 57 IN | TEMPERATURE: 96.7 F | OXYGEN SATURATION: 97 %

## 2021-12-14 DIAGNOSIS — Z00.00 ENCOUNTER FOR GENERAL ADULT MEDICAL EXAMINATION W/OUT ABNORMAL FINDINGS: ICD-10-CM

## 2021-12-14 PROCEDURE — 99214 OFFICE O/P EST MOD 30 MIN: CPT

## 2021-12-14 NOTE — HISTORY OF PRESENT ILLNESS
[One fall no injury in past year] : Patient reported one fall in the past year without injury [Walker] : walker [FreeTextEntry1] : pt is 89 year old alert lady lives with daughter\par DM, htn\par pelvic fracture 4/2017\par \par some left groin pain -\par relief from tylenol\par \par reviewed all labs from october\par hba1c 5.9\par \par eats donuts\par does not check sugar\par \par 5/14/19\par pt has been well\par c/o lbp - radiates down left leg\par uses tylenol\par interested in acupuncture\par 99 on monday\par noncompliant with diet\par very alert\par \par 9/17/19\par pain left leg radiates to lower back\par stiff, tingling hands - fingers lock\par pt wakes at 2 am tylenol worn off\par take 2 bid - urged to increase\par saw Dr Gordillo\par He ordered MR L spine\par left hip xray\par grand daughter PT\par \par 12/3/19\par pt had been feeling unwell - had some chest pain\par stumbled ?fell - went to urgent care - then came here to f/up\par came in for urgent appt - found to have hb 7.7\par came to ER - got a unit of blood - blood in stool\par prob UGI bleed\par ? re endoscopy \par did not have it done\par gave her pantaprozole\par \par off glipizde and asa\par \par no abd pain\par one black stool\par now they are brown again\par no more chest pain\par \par 12/17/19\par knee and back pain\par no bleeding\par got PT\par \par hit corner of dressor to rt ribs\par \par 2/25/20\par takes ulcer meds\par can have tomato sauce if  no pain\par off DM meds\par no blood in stool\par left leg hurts\par low back pain\par \par 4/28/20\par \par called [phone]\par back/hip pains\par in in [facility] with family bc of covid\par likes cakes\par \par one day was weak - fsg - after eating donut - 184\par paramedics checked her and she was ok\par \par BP checked once - will do again and send me \par \par 5/18/21\par pt has been in [place] during covid\par first visit in a year\par vaccinated against covid in april \par was in ER 2 weeks ago in [place] - dehydrated - got bolus fluids\par \par 7/27/21\par pt has been well\par concern re labile BP\par got new cuff - correlates with our readings\par today 146/77 by her machine\par 148/72 by ours\par saw Dr Suggs - did not di echo to w/up systolic murmur\par no symptoms \par no cp ,sob, syncope\par walks with walker\par presently losartan 25 mg qam\par toprol 25  two in am and one in pm\par nifedipine ER 30 mg qhs\par \par last labs May - 2021\par \par pt is very alert\par uses walker\par \par 9/28/21\par has been well\par needs labs\par flu vaccine\par \par BP has been ok\par once heart rate went to 40s\par no dizziness\par dizzy if gets up fast\par does not drink much fluids\par gatorade/water\par incontinence worsened\par continous malodorus urine\par wants to see urogyn - \par \par 12/14/21\par back from rehab at [place]\par he had multiple fractures\par she is well\par c/o ears clogged\par to get booster on thursday\par \par rt shoulder\par rt hand\par fingers\par pelvis\par all fractured\par \par

## 2021-12-14 NOTE — REVIEW OF SYSTEMS
[Recent Weight Loss (___ Lbs)] : recent [unfilled] ~Ulb weight loss [Loss Of Hearing] : hearing loss [Lower Ext Edema] : lower extremity edema [Arthralgias] : arthralgias [Negative] : Heme/Lymph [FreeTextEntry5] : +1 ankle edema

## 2021-12-14 NOTE — PHYSICAL EXAM
[General Appearance - Alert] : alert [General Appearance - In No Acute Distress] : in no acute distress [General Appearance - Well Nourished] : well nourished [General Appearance - Well Developed] : well developed [General Appearance - Well-Appearing] : healthy appearing [Extraocular Movements] : extraocular movements were intact [Normal Oral Mucosa] : normal oral mucosa [No Oral Pallor] : no oral pallor [No Oral Cyanosis] : no oral cyanosis [Outer Ear] : the ears and nose were normal in appearance [Oropharynx] : The oropharynx was normal [Neck Appearance] : the appearance of the neck was normal [Neck Cervical Mass (___cm)] : no neck mass was observed [Jugular Venous Distention Increased] : there was no jugular-venous distention [Auscultation Breath Sounds / Voice Sounds] : lungs were clear to auscultation bilaterally [Heart Rate And Rhythm] : heart rate was normal and rhythm regular [Heart Sounds] : normal S1 and S2 [Heart Sounds Gallop] : no gallops [Heart Sounds Pericardial Friction Rub] : no pericardial rub [Bowel Sounds] : normal bowel sounds [Abdomen Soft] : soft [Abdomen Tenderness] : non-tender [Abdomen Mass (___ Cm)] : no abdominal mass palpated [Cervical Lymph Nodes Enlarged Anterior Bilaterally] : anterior cervical [No CVA Tenderness] : no ~M costovertebral angle tenderness [No Spinal Tenderness] : no spinal tenderness [Involuntary Movements] : no involuntary movements were seen [Skin Color & Pigmentation] : normal skin color and pigmentation [] : no rash [Skin Turgor] : normal skin turgor [Sensation] : the sensory exam was normal to light touch and pinprick [No Focal Deficits] : no focal deficits [Alert] : alert [Well Nourished] : well nourished [No Acute Distress] : in no acute distress [Well Developed] : well developed [Sclera] : the sclera and conjunctiva were normal [PERRL] : pupils were equal in size, round, and reactive to light [Normal] : no focal deficits [de-identified] : cerumen cleared from ears with bionix lit jarad [de-identified] : finger locked rt hand [de-identified] : red ness rt knee, buttocks clear [FreeTextEntry1] : pt is completely alert

## 2021-12-15 LAB
ALBUMIN SERPL ELPH-MCNC: 4.4 G/DL
ALP BLD-CCNC: 118 U/L
ALT SERPL-CCNC: 15 U/L
ANION GAP SERPL CALC-SCNC: 17 MMOL/L
AST SERPL-CCNC: 18 U/L
BASOPHILS # BLD AUTO: 0.06 K/UL
BASOPHILS NFR BLD AUTO: 0.7 %
BILIRUB SERPL-MCNC: 0.3 MG/DL
BUN SERPL-MCNC: 30 MG/DL
CALCIUM SERPL-MCNC: 9.2 MG/DL
CHLORIDE SERPL-SCNC: 104 MMOL/L
CHOLEST SERPL-MCNC: 170 MG/DL
CO2 SERPL-SCNC: 22 MMOL/L
CREAT SERPL-MCNC: 1.19 MG/DL
EOSINOPHIL # BLD AUTO: 0.09 K/UL
EOSINOPHIL NFR BLD AUTO: 1 %
ESTIMATED AVERAGE GLUCOSE: 134 MG/DL
GLUCOSE SERPL-MCNC: 136 MG/DL
HBA1C MFR BLD HPLC: 6.3 %
HCT VFR BLD CALC: 39.6 %
HDLC SERPL-MCNC: 61 MG/DL
HGB BLD-MCNC: 12.7 G/DL
IMM GRANULOCYTES NFR BLD AUTO: 0.7 %
LDLC SERPL CALC-MCNC: 75 MG/DL
LYMPHOCYTES # BLD AUTO: 2.78 K/UL
LYMPHOCYTES NFR BLD AUTO: 30.3 %
MAGNESIUM SERPL-MCNC: 1.2 MG/DL
MAN DIFF?: NORMAL
MCHC RBC-ENTMCNC: 32.1 GM/DL
MCHC RBC-ENTMCNC: 33.3 PG
MCV RBC AUTO: 103.9 FL
MONOCYTES # BLD AUTO: 0.82 K/UL
MONOCYTES NFR BLD AUTO: 8.9 %
NEUTROPHILS # BLD AUTO: 5.38 K/UL
NEUTROPHILS NFR BLD AUTO: 58.4 %
NONHDLC SERPL-MCNC: 108 MG/DL
PLATELET # BLD AUTO: 333 K/UL
POTASSIUM SERPL-SCNC: 4.3 MMOL/L
PROT SERPL-MCNC: 6.9 G/DL
RBC # BLD: 3.81 M/UL
RBC # FLD: 14.4 %
SODIUM SERPL-SCNC: 142 MMOL/L
TRIGL SERPL-MCNC: 165 MG/DL
WBC # FLD AUTO: 9.19 K/UL

## 2022-01-04 ENCOUNTER — NON-APPOINTMENT (OUTPATIENT)
Age: 87
End: 2022-01-04

## 2022-02-04 ENCOUNTER — NON-APPOINTMENT (OUTPATIENT)
Age: 87
End: 2022-02-04

## 2022-02-22 ENCOUNTER — NON-APPOINTMENT (OUTPATIENT)
Age: 87
End: 2022-02-22

## 2022-03-16 ENCOUNTER — LABORATORY RESULT (OUTPATIENT)
Age: 87
End: 2022-03-16

## 2022-03-17 ENCOUNTER — LABORATORY RESULT (OUTPATIENT)
Age: 87
End: 2022-03-17

## 2022-03-22 ENCOUNTER — APPOINTMENT (OUTPATIENT)
Dept: GERIATRICS | Facility: CLINIC | Age: 87
End: 2022-03-22
Payer: MEDICARE

## 2022-03-22 VITALS
WEIGHT: 87 LBS | BODY MASS INDEX: 18.83 KG/M2 | OXYGEN SATURATION: 96 % | TEMPERATURE: 97.9 F | SYSTOLIC BLOOD PRESSURE: 100 MMHG | HEART RATE: 68 BPM | DIASTOLIC BLOOD PRESSURE: 60 MMHG

## 2022-03-22 VITALS — OXYGEN SATURATION: 99 % | DIASTOLIC BLOOD PRESSURE: 60 MMHG | HEART RATE: 64 BPM | SYSTOLIC BLOOD PRESSURE: 100 MMHG

## 2022-03-22 VITALS — WEIGHT: 89.8 LBS | BODY MASS INDEX: 19.43 KG/M2

## 2022-03-22 PROCEDURE — 99214 OFFICE O/P EST MOD 30 MIN: CPT

## 2022-03-22 NOTE — ASSESSMENT
[FreeTextEntry1] : repeat labs in 3 months\par renewed meds\par \par called [facility] - await news on pneumonia vaccine\par \par 5/14/19\par left ear cleaned of cerumen\par check labs\par refer to pain/acupuncture drs\par \par 9/17/19\par pt going for MRI spine and hip films today\par from Dr Fernandez\par encouraged increase tylenol to 1000 mg tid\par labs fine in may - no need to repeat today\par hand exercises\par lives with grand daughter - a PT\par clipped toe nails\par cleaned ears\par gave flu vaccine\par \par 12/3/19\par avoid nsaids\par repeat labs\par cont pantaprozole\par f/up 2 weeks\par \par 12/17/19\par cont off DM meds\par recheck labs\par f.up feb if stable\par \par 2/25/20\par checking labs today\par pain management for back\par \par 5/18/21\par pt had episode of weakness - two weeks ago\par responded to IV fluids\par pt has loud systolic mumur - which is louder than last visit\par with sinus arrythmia\par will arrange cardiac eval\par \par 7/27/21\par last few days BP in 140s\par will continue to monitor on same meds\par did not change today\par as going well with new cuff and meds\par \par 9/28/21\par LE edema - dependent - needs to elevate\par lungs- clear\par BP better\par check labs\par flu vaccine     given\par cont same meds\par to get echo and cardiac check up\par \par 3/22/22\par decrease metoprolol 25 bid (from 2 in am)\par doing PT and OT\par reviewed labs and gave  copy\par check BP at home\par alert, not depressed\par

## 2022-03-22 NOTE — REVIEW OF SYSTEMS
[Recent Weight Loss (___ Lbs)] : recent [unfilled] ~Ulb weight loss [Loss Of Hearing] : hearing loss [Lower Ext Edema] : lower extremity edema [Arthralgias] : arthralgias [Negative] : Heme/Lymph [FreeTextEntry2] : eating [FreeTextEntry5] : +1 ankle edema

## 2022-03-22 NOTE — HISTORY OF PRESENT ILLNESS
[One fall no injury in past year] : Patient reported one fall in the past year without injury [Walker] : walker [FreeTextEntry1] : pt is 89 year old alert lady lives with daughter\par DM, htn\par pelvic fracture 4/2017\par \par some left groin pain -\par relief from tylenol\par \par reviewed all labs from october\par hba1c 5.9\par \par eats donuts\par does not check sugar\par \par 5/14/19\par pt has been well\par c/o lbp - radiates down left leg\par uses tylenol\par interested in acupuncture\par 99 on monday\par noncompliant with diet\par very alert\par \par 9/17/19\par pain left leg radiates to lower back\par stiff, tingling hands - fingers lock\par pt wakes at 2 am tylenol worn off\par take 2 bid - urged to increase\par saw Dr Gordillo\par He ordered MR L spine\par left hip xray\par grand daughter PT\par \par 12/3/19\par pt had been feeling unwell - had some chest pain\par stumbled ?fell - went to urgent care - then came here to f/up\par came in for urgent appt - found to have hb 7.7\par came to ER - got a unit of blood - blood in stool\par prob UGI bleed\par ? re endoscopy \par did not have it done\par gave her pantaprozole\par \par off glipizde and asa\par \par no abd pain\par one black stool\par now they are brown again\par no more chest pain\par \par 12/17/19\par knee and back pain\par no bleeding\par got PT\par \par hit corner of dressor to rt ribs\par \par 2/25/20\par takes ulcer meds\par can have tomato sauce if  no pain\par off DM meds\par no blood in stool\par left leg hurts\par low back pain\par \par 4/28/20\par \par called [phone]\par back/hip pains\par in in [facility] with family bc of covid\par likes cakes\par \par one day was weak - fsg - after eating donut - 184\par paramedics checked her and she was ok\par \par BP checked once - will do again and send me \par \par 5/18/21\par pt has been in [place] during covid\par first visit in a year\par vaccinated against covid in april \par was in ER 2 weeks ago in [place] - dehydrated - got bolus fluids\par \par 7/27/21\par pt has been well\par concern re labile BP\par got new cuff - correlates with our readings\par today 146/77 by her machine\par 148/72 by ours\par saw Dr Suggs - did not di echo to w/up systolic murmur\par no symptoms \par no cp ,sob, syncope\par walks with walker\par presently losartan 25 mg qam\par toprol 25  two in am and one in pm\par nifedipine ER 30 mg qhs\par \par last labs May - 2021\par \par pt is very alert\par uses walker\par \par 9/28/21\par has been well\par needs labs\par flu vaccine\par \par BP has been ok\par once heart rate went to 40s\par no dizziness\par dizzy if gets up fast\par does not drink much fluids\par gatorade/water\par incontinence worsened\par continous malodorus urine\par wants to see urogyn - \par \par 12/14/21\par back from rehab at [place]\par he had multiple fractures\par she is well\par c/o ears clogged\par to get booster on thursday\par \par rt shoulder\par rt hand\par fingers\par pelvis\par all fractured\par \par

## 2022-03-22 NOTE — PHYSICAL EXAM
[Alert] : alert [Well Nourished] : well nourished [Well Developed] : well developed [PERRL] : pupils were equal in size, round, and reactive to light [Normal] : no focal deficits [General Appearance - Alert] : alert [General Appearance - In No Acute Distress] : in no acute distress [General Appearance - Well Nourished] : well nourished [General Appearance - Well Developed] : well developed [General Appearance - Well-Appearing] : healthy appearing [Sclera] : the sclera and conjunctiva were normal [Extraocular Movements] : extraocular movements were intact [Normal Oral Mucosa] : normal oral mucosa [Outer Ear] : the ears and nose were normal in appearance [Neck Appearance] : the appearance of the neck was normal [Neck Cervical Mass (___cm)] : no neck mass was observed [Jugular Venous Distention Increased] : there was no jugular-venous distention [Auscultation Breath Sounds / Voice Sounds] : lungs were clear to auscultation bilaterally [Heart Rate And Rhythm] : heart rate was normal and rhythm regular [Heart Sounds] : normal S1 and S2 [Heart Sounds Gallop] : no gallops [Heart Sounds Pericardial Friction Rub] : no pericardial rub [Bowel Sounds] : normal bowel sounds [Abdomen Soft] : soft [Abdomen Tenderness] : non-tender [Abdomen Mass (___ Cm)] : no abdominal mass palpated [Cervical Lymph Nodes Enlarged Anterior Bilaterally] : anterior cervical [No CVA Tenderness] : no ~M costovertebral angle tenderness [No Spinal Tenderness] : no spinal tenderness [Involuntary Movements] : no involuntary movements were seen [Skin Color & Pigmentation] : normal skin color and pigmentation [Skin Turgor] : normal skin turgor [] : no rash [Sensation] : the sensory exam was normal to light touch and pinprick [No Focal Deficits] : no focal deficits [No Oral Pallor] : no oral pallor [No Oral Cyanosis] : no oral cyanosis [Oropharynx] : the oropharynx was normal [de-identified] : loose tooth [de-identified] : s1s2 rrr 2-3/6 systolic murmur [de-identified] : +1 pedal edema [de-identified] : finger locked rt and left  hand [de-identified] : red ness rt knee, buttocks clear [FreeTextEntry1] : pt is completely alert

## 2022-06-07 ENCOUNTER — APPOINTMENT (OUTPATIENT)
Dept: GERIATRICS | Facility: CLINIC | Age: 87
End: 2022-06-07
Payer: MEDICARE

## 2022-06-07 ENCOUNTER — LABORATORY RESULT (OUTPATIENT)
Age: 87
End: 2022-06-07

## 2022-06-07 VITALS
SYSTOLIC BLOOD PRESSURE: 118 MMHG | HEART RATE: 69 BPM | BODY MASS INDEX: 19.26 KG/M2 | WEIGHT: 89 LBS | OXYGEN SATURATION: 98 % | DIASTOLIC BLOOD PRESSURE: 70 MMHG

## 2022-06-07 VITALS
HEIGHT: 57 IN | WEIGHT: 93 LBS | BODY MASS INDEX: 20.06 KG/M2 | DIASTOLIC BLOOD PRESSURE: 58 MMHG | SYSTOLIC BLOOD PRESSURE: 124 MMHG | HEART RATE: 69 BPM | OXYGEN SATURATION: 99 %

## 2022-06-07 DIAGNOSIS — E78.00 PURE HYPERCHOLESTEROLEMIA, UNSPECIFIED: ICD-10-CM

## 2022-06-07 DIAGNOSIS — R32 UNSPECIFIED URINARY INCONTINENCE: ICD-10-CM

## 2022-06-07 DIAGNOSIS — R44.1 VISUAL HALLUCINATIONS: ICD-10-CM

## 2022-06-07 PROCEDURE — 99214 OFFICE O/P EST MOD 30 MIN: CPT

## 2022-06-07 NOTE — ASSESSMENT
[FreeTextEntry1] : repeat labs in 3 months\par renewed meds\par \par called fla - await news on pneumonia vaccine\par \par 5/14/19\par left ear cleaned of cerumen\par check labs\par refer to pain/acupuncture drs\par \par 9/17/19\par pt going for MRI spine and hip films today\par from Dr Fernandez\par encouraged increase tylenol to 1000 mg tid\par labs fine in may - no need to repeat today\par hand exercises\par lives with grand daughter - a PT\par clipped toe nails\par cleaned ears\par gave flu vaccine\par \par 12/3/19\par avoid nsaids\par repeat labs\par cont pantaprozole\par f/up 2 weeks\par \par 12/17/19\par cont off DM meds\par recheck labs\par f.up feb if stable\par \par 2/25/20\par checking labs today\par pain management for back\par \par 5/18/21\par pt had episode of weakness - two weeks ago\par responded to IV fluids\par pt has loud systolic mumur - which is louder than last visit\par with sinus arrythmia\par will arrange cardiac eval\par \par 7/27/21\par last few days BP in 140s\par will continue to monitor on same meds\par did not change today\par as going well with new cuff and meds\par \par 9/28/21\par LE edema - dependent - needs to elevate\par lungs- clear\par BP better\par check labs\par flu vaccine     given\par cont same meds\par to get echo and cardiac check up\par \par 3/22/22\par decrease metoprolol 25 bid (from 2 in am)\par doing PT and OT\par reviewed labs and gave  copy\par check BP at home\par alert, not depressed\par \par 6/7/22\par [employer] - suggested amplifier\par going to grand daughters wedding\par pt states she is depressed - trying 5 mg lexapro\par check labs again\par r/o UTI

## 2022-06-07 NOTE — PHYSICAL EXAM
[Alert] : alert [Well Nourished] : well nourished [Well Developed] : well developed [PERRL] : pupils were equal in size, round, and reactive to light [Normal] : no focal deficits [General Appearance - Alert] : alert [General Appearance - In No Acute Distress] : in no acute distress [General Appearance - Well Nourished] : well nourished [General Appearance - Well Developed] : well developed [General Appearance - Well-Appearing] : healthy appearing [Sclera] : the sclera and conjunctiva were normal [Extraocular Movements] : extraocular movements were intact [Normal Oral Mucosa] : normal oral mucosa [No Oral Pallor] : no oral pallor [No Oral Cyanosis] : no oral cyanosis [Outer Ear] : the ears and nose were normal in appearance [Oropharynx] : The oropharynx was normal [Neck Appearance] : the appearance of the neck was normal [Neck Cervical Mass (___cm)] : no neck mass was observed [Jugular Venous Distention Increased] : there was no jugular-venous distention [Auscultation Breath Sounds / Voice Sounds] : lungs were clear to auscultation bilaterally [Heart Rate And Rhythm] : heart rate was normal and rhythm regular [Heart Sounds] : normal S1 and S2 [Heart Sounds Gallop] : no gallops [Heart Sounds Pericardial Friction Rub] : no pericardial rub [Bowel Sounds] : normal bowel sounds [Abdomen Soft] : soft [Abdomen Tenderness] : non-tender [Abdomen Mass (___ Cm)] : no abdominal mass palpated [Cervical Lymph Nodes Enlarged Anterior Bilaterally] : anterior cervical [No CVA Tenderness] : no ~M costovertebral angle tenderness [No Spinal Tenderness] : no spinal tenderness [Involuntary Movements] : no involuntary movements were seen [Skin Color & Pigmentation] : normal skin color and pigmentation [Skin Turgor] : normal skin turgor [] : no rash [Sensation] : the sensory exam was normal to light touch and pinprick [No Focal Deficits] : no focal deficits [de-identified] : loose tooth [de-identified] : s1s2 rrr 2-3/6 systolic murmur [de-identified] : +1 pedal edema [de-identified] : finger locked rt and left  hand [de-identified] : red ness rt knee, buttocks clear [FreeTextEntry1] : pt is completely alert

## 2022-06-07 NOTE — REVIEW OF SYSTEMS
[Loss Of Hearing] : hearing loss [Lower Ext Edema] : lower extremity edema [Arthralgias] : arthralgias [Negative] : Heme/Lymph [Recent Weight Loss (___ Lbs)] : no recent weight loss [FreeTextEntry2] : eating [FreeTextEntry4] : removed some cerumen rt [FreeTextEntry5] : trace ankle edema

## 2022-06-07 NOTE — HISTORY OF PRESENT ILLNESS
[One fall no injury in past year] : Patient reported one fall in the past year without injury [Walker] : walker [FreeTextEntry1] : pt is 89 year old alert lady lives with daughter\par DM, htn\par pelvic fracture 4/2017\par \par some left groin pain -\par relief from tylenol\par \par reviewed all labs from october\par hba1c 5.9\par \par eats donuts\par does not check sugar\par \par 5/14/19\par pt has been well\par c/o lbp - radiates down left leg\par uses tylenol\par interested in acupuncture\par 99 on monday\par noncompliant with diet\par very alert\par \par 9/17/19\par pain left leg radiates to lower back\par stiff, tingling hands - fingers lock\par pt wakes at 2 am tylenol worn off\par take 2 bid - urged to increase\par saw Dr Gordillo\par He ordered MR L spine\par left hip xray\par grand daughter PT\par \par 12/3/19\par pt had been feeling unwell - had some chest pain\par stumbled ?fell - went to urgent care - then came here to f/up\par came in for urgent appt - found to have hb 7.7\par came to ER - got a unit of blood - blood in stool\par prob UGI bleed\par ? re endoscopy \par did not have it done\par gave her pantaprozole\par \par off glipizde and asa\par \par no abd pain\par one black stool\par now they are brown again\par no more chest pain\par \par 12/17/19\par knee and back pain\par no bleeding\par got PT\par \par hit corner of dressor to rt ribs\par \par 2/25/20\par takes ulcer meds\par can have tomato sauce if  no pain\par off DM meds\par no blood in stool\par left leg hurts\par low back pain\par \par 4/28/20\par \par called [phone]\par back/hip pains\par in in [facility] with family bc of covid\par likes cakes\par \par one day was weak - fsg - after eating donut - 184\par paramedics checked her and she was ok\par \par BP checked once - will do again and send me \par \par 5/18/21\par pt has been in [place] during covid\par first visit in a year\par vaccinated against covid in april \par was in ER 2 weeks ago in [place] - dehydrated - got bolus fluids\par \par 7/27/21\par pt has been well\par concern re labile BP\par got new cuff - correlates with our readings\par today 146/77 by her machine\par 148/72 by ours\par saw Dr Suggs - did not di echo to w/up systolic murmur\par no symptoms \par no cp ,sob, syncope\par walks with walker\par presently losartan 25 mg qam\par toprol 25  two in am and one in pm\par nifedipine ER 30 mg qhs\par \par last labs May - 2021\par \par pt is very alert\par uses walker\par \par 9/28/21\par has been well\par needs labs\par flu vaccine\par \par BP has been ok\par once heart rate went to 40s\par no dizziness\par dizzy if gets up fast\par does not drink much fluids\par gatorade/water\par incontinence worsened\par continous malodorus urine\par wants to see urogyn - \par \par 12/14/21\par back from rehab at [place]\par he had multiple fractures\par she is well\par c/o ears clogged\par to get booster on thursday\par \par rt shoulder\par rt hand\par fingers\par pelvis\par all fractured\par \par

## 2022-06-08 LAB
APPEARANCE: CLEAR
BASOPHILS # BLD AUTO: 0.05 K/UL
BASOPHILS NFR BLD AUTO: 0.6 %
BILIRUBIN URINE: NEGATIVE
BLOOD URINE: NEGATIVE
COLOR: YELLOW
EOSINOPHIL # BLD AUTO: 0.08 K/UL
EOSINOPHIL NFR BLD AUTO: 1 %
ESTIMATED AVERAGE GLUCOSE: 131 MG/DL
GLUCOSE QUALITATIVE U: NEGATIVE
HBA1C MFR BLD HPLC: 6.2 %
HCT VFR BLD CALC: 38.4 %
HGB BLD-MCNC: 12.6 G/DL
IMM GRANULOCYTES NFR BLD AUTO: 0.6 %
KETONES URINE: NEGATIVE
LEUKOCYTE ESTERASE URINE: NEGATIVE
LYMPHOCYTES # BLD AUTO: 2.62 K/UL
LYMPHOCYTES NFR BLD AUTO: 31.6 %
MAN DIFF?: NORMAL
MCHC RBC-ENTMCNC: 32.8 GM/DL
MCHC RBC-ENTMCNC: 33.6 PG
MCV RBC AUTO: 102.4 FL
MONOCYTES # BLD AUTO: 0.73 K/UL
MONOCYTES NFR BLD AUTO: 8.8 %
NEUTROPHILS # BLD AUTO: 4.76 K/UL
NEUTROPHILS NFR BLD AUTO: 57.4 %
NITRITE URINE: NEGATIVE
PH URINE: 6
PLATELET # BLD AUTO: 282 K/UL
PROTEIN URINE: ABNORMAL
RBC # BLD: 3.75 M/UL
RBC # FLD: 14.1 %
SPECIFIC GRAVITY URINE: 1.02
UROBILINOGEN URINE: NORMAL
WBC # FLD AUTO: 8.29 K/UL

## 2022-06-09 LAB
ALBUMIN SERPL ELPH-MCNC: 4.5 G/DL
ALP BLD-CCNC: 99 U/L
ALT SERPL-CCNC: 13 U/L
ANION GAP SERPL CALC-SCNC: 16 MMOL/L
AST SERPL-CCNC: 18 U/L
BILIRUB SERPL-MCNC: 0.4 MG/DL
BUN SERPL-MCNC: 42 MG/DL
CALCIUM SERPL-MCNC: 9.6 MG/DL
CHLORIDE SERPL-SCNC: 101 MMOL/L
CO2 SERPL-SCNC: 22 MMOL/L
CREAT SERPL-MCNC: 1.16 MG/DL
EGFR: 42 ML/MIN/1.73M2
GLUCOSE SERPL-MCNC: 139 MG/DL
POTASSIUM SERPL-SCNC: 4.7 MMOL/L
PROT SERPL-MCNC: 7.3 G/DL
SODIUM SERPL-SCNC: 140 MMOL/L

## 2022-08-15 ENCOUNTER — APPOINTMENT (OUTPATIENT)
Dept: GERIATRICS | Facility: CLINIC | Age: 87
End: 2022-08-15

## 2022-08-15 VITALS
DIASTOLIC BLOOD PRESSURE: 64 MMHG | BODY MASS INDEX: 19.69 KG/M2 | TEMPERATURE: 96.4 F | SYSTOLIC BLOOD PRESSURE: 144 MMHG | WEIGHT: 91 LBS | OXYGEN SATURATION: 98 % | HEART RATE: 63 BPM

## 2022-08-15 PROCEDURE — 99213 OFFICE O/P EST LOW 20 MIN: CPT | Mod: 25

## 2022-08-15 PROCEDURE — 36415 COLL VENOUS BLD VENIPUNCTURE: CPT

## 2022-08-15 NOTE — PHYSICAL EXAM
[No Acute Distress] : no acute distress [Normal Sclera/Conjunctiva] : normal sclera/conjunctiva [PERRL] : pupils equal round and reactive to light [EOMI] : extraocular movements intact [Normal Outer Ear/Nose] : the outer ears and nose were normal in appearance [Normal Oropharynx] : the oropharynx was normal [No JVD] : no jugular venous distention [No Respiratory Distress] : no respiratory distress  [No Accessory Muscle Use] : no accessory muscle use [Clear to Auscultation] : lungs were clear to auscultation bilaterally [Normal Rate] : normal rate  [Regular Rhythm] : with a regular rhythm [Normal S1, S2] : normal S1 and S2 [Soft] : abdomen soft [Non Tender] : non-tender [Non-distended] : non-distended [Normal Bowel Sounds] : normal bowel sounds [Speech Grossly Normal] : speech grossly normal [de-identified] : Left canal excessive cerumen, right TM normal, poor dentition  [de-identified] : 3/6 murmur [de-identified] : 1+ bilateral pedal edema

## 2022-08-15 NOTE — REVIEW OF SYSTEMS
[Hearing Loss] : hearing loss [Chest Pain] : no chest pain [Lower Ext Edema] : lower extremity edema [Shortness Of Breath] : no shortness of breath [Incontinence] : incontinence [Memory Loss] : memory loss [Negative] : Heme/Lymph

## 2022-08-15 NOTE — HISTORY OF PRESENT ILLNESS
[FreeTextEntry1] : Follow up [de-identified] : Dr. Townsend patient, visit for 8/16/22 was cancelled.  Daughter will be going to [place] and patient will be in respite care Monday 8/22 for about 4 weeks.  Needs med list for facility.  Would like labs checked today.  \par \par Was prescribed lexapro 5 mg at last visit but has not yet started as daughter will wait until she comes back from respite.  \par \par Patient with no acute complaints.

## 2022-08-16 ENCOUNTER — APPOINTMENT (OUTPATIENT)
Dept: GERIATRICS | Facility: CLINIC | Age: 87
End: 2022-08-16

## 2022-08-19 LAB
ALBUMIN SERPL ELPH-MCNC: 4.4 G/DL
ALP BLD-CCNC: 94 U/L
ALT SERPL-CCNC: 10 U/L
ANION GAP SERPL CALC-SCNC: 16 MMOL/L
AST SERPL-CCNC: 17 U/L
BASOPHILS # BLD AUTO: 0.05 K/UL
BASOPHILS NFR BLD AUTO: 0.6 %
BILIRUB SERPL-MCNC: 0.3 MG/DL
BUN SERPL-MCNC: 36 MG/DL
CALCIUM SERPL-MCNC: 9.6 MG/DL
CHLORIDE SERPL-SCNC: 101 MMOL/L
CO2 SERPL-SCNC: 23 MMOL/L
CREAT SERPL-MCNC: 1.21 MG/DL
EGFR: 40 ML/MIN/1.73M2
EOSINOPHIL # BLD AUTO: 0.08 K/UL
EOSINOPHIL NFR BLD AUTO: 0.9 %
ESTIMATED AVERAGE GLUCOSE: 134 MG/DL
GLUCOSE SERPL-MCNC: 117 MG/DL
HBA1C MFR BLD HPLC: 6.3 %
HCT VFR BLD CALC: 42 %
HGB BLD-MCNC: 13.6 G/DL
IMM GRANULOCYTES NFR BLD AUTO: 0.6 %
LYMPHOCYTES # BLD AUTO: 2.85 K/UL
LYMPHOCYTES NFR BLD AUTO: 32.2 %
MAN DIFF?: NORMAL
MCHC RBC-ENTMCNC: 32.4 GM/DL
MCHC RBC-ENTMCNC: 33.2 PG
MCV RBC AUTO: 102.4 FL
MONOCYTES # BLD AUTO: 0.91 K/UL
MONOCYTES NFR BLD AUTO: 10.3 %
NEUTROPHILS # BLD AUTO: 4.92 K/UL
NEUTROPHILS NFR BLD AUTO: 55.4 %
PLATELET # BLD AUTO: 297 K/UL
POTASSIUM SERPL-SCNC: 4.7 MMOL/L
PROT SERPL-MCNC: 7.4 G/DL
RBC # BLD: 4.1 M/UL
RBC # FLD: 13.4 %
SODIUM SERPL-SCNC: 139 MMOL/L
WBC # FLD AUTO: 8.86 K/UL

## 2022-10-10 ENCOUNTER — APPOINTMENT (OUTPATIENT)
Dept: CARDIOLOGY | Facility: CLINIC | Age: 87
End: 2022-10-10

## 2022-12-06 ENCOUNTER — APPOINTMENT (OUTPATIENT)
Dept: GERIATRICS | Facility: CLINIC | Age: 87
End: 2022-12-06

## 2022-12-06 ENCOUNTER — RESULT REVIEW (OUTPATIENT)
Age: 87
End: 2022-12-06

## 2022-12-06 VITALS
WEIGHT: 86 LBS | HEIGHT: 57 IN | OXYGEN SATURATION: 97 % | DIASTOLIC BLOOD PRESSURE: 66 MMHG | HEART RATE: 76 BPM | BODY MASS INDEX: 18.55 KG/M2 | SYSTOLIC BLOOD PRESSURE: 124 MMHG
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
## 2022-12-06 DIAGNOSIS — M54.50 LOW BACK PAIN, UNSPECIFIED: ICD-10-CM

## 2022-12-06 DIAGNOSIS — R10.32 LEFT LOWER QUADRANT PAIN: ICD-10-CM

## 2022-12-06 PROCEDURE — 99214 OFFICE O/P EST MOD 30 MIN: CPT

## 2022-12-06 NOTE — PHYSICAL EXAM
[Alert] : alert [Well Nourished] : well nourished [Well Developed] : well developed [PERRL] : pupils were equal in size, round, and reactive to light [No Respiratory Distress] : no respiratory distress [No Acc Muscle Use] : no accessory muscle use [Respiration, Rhythm And Depth] : normal respiratory rhythm and effort [Normal] : no focal deficits [General Appearance - Alert] : alert [General Appearance - In No Acute Distress] : in no acute distress [General Appearance - Well Nourished] : well nourished [General Appearance - Well Developed] : well developed [General Appearance - Well-Appearing] : healthy appearing [Sclera] : the sclera and conjunctiva were normal [Extraocular Movements] : extraocular movements were intact [Normal Oral Mucosa] : normal oral mucosa [No Oral Pallor] : no oral pallor [No Oral Cyanosis] : no oral cyanosis [Outer Ear] : the ears and nose were normal in appearance [Oropharynx] : The oropharynx was normal [Neck Appearance] : the appearance of the neck was normal [Neck Cervical Mass (___cm)] : no neck mass was observed [Jugular Venous Distention Increased] : there was no jugular-venous distention [Auscultation Breath Sounds / Voice Sounds] : lungs were clear to auscultation bilaterally [Heart Rate And Rhythm] : heart rate was normal and rhythm regular [Heart Sounds] : normal S1 and S2 [Heart Sounds Gallop] : no gallops [Heart Sounds Pericardial Friction Rub] : no pericardial rub [Bowel Sounds] : normal bowel sounds [Abdomen Soft] : soft [Abdomen Tenderness] : non-tender [Abdomen Mass (___ Cm)] : no abdominal mass palpated [Cervical Lymph Nodes Enlarged Anterior Bilaterally] : anterior cervical [No CVA Tenderness] : no ~M costovertebral angle tenderness [No Spinal Tenderness] : no spinal tenderness [Involuntary Movements] : no involuntary movements were seen [Skin Color & Pigmentation] : normal skin color and pigmentation [Skin Turgor] : normal skin turgor [] : no rash [Sensation] : the sensory exam was normal to light touch and pinprick [No Focal Deficits] : no focal deficits [de-identified] : s1s2 rrr 2-3/6 systolic murmur [de-identified] : low back pain - rt groin pain [de-identified] : finger locked rt and left  hand [de-identified] : good rom knees -  buttocks clear [FreeTextEntry1] : pt is completely alert

## 2022-12-06 NOTE — ASSESSMENT
[FreeTextEntry1] : repeat labs in 3 months\par renewed meds\par \par called fla - await news on pneumonia vaccine\par \par 5/14/19\par left ear cleaned of cerumen\par check labs\par refer to pain/acupuncture drs\par \par 9/17/19\par pt going for MRI spine and hip films today\par from Dr Fernandez\par encouraged increase tylenol to 1000 mg tid\par labs fine in may - no need to repeat today\par hand exercises\par lives with grand daughter - a PT\par clipped toe nails\par cleaned ears\par gave flu vaccine\par \par 12/3/19\par avoid nsaids\par repeat labs\par cont pantaprozole\par f/up 2 weeks\par \par 12/17/19\par cont off DM meds\par recheck labs\par f.up feb if stable\par \par 2/25/20\par checking labs today\par pain management for back\par \par 5/18/21\par pt had episode of weakness - two weeks ago\par responded to IV fluids\par pt has loud systolic mumur - which is louder than last visit\par with sinus arrythmia\par will arrange cardiac eval\par \par 7/27/21\par last few days BP in 140s\par will continue to monitor on same meds\par did not change today\par as going well with new cuff and meds\par \par 9/28/21\par LE edema - dependent - needs to elevate\par lungs- clear\par BP better\par check labs\par flu vaccine     given\par cont same meds\par to get echo and cardiac check up\par \par 3/22/22\par decrease metoprolol 25 bid (from 2 in am)\par doing PT and OT\par reviewed labs and gave  copy\par check BP at home\par alert, not depressed\par \par 6/7/22\par [employer] - suggested amplifier\par going to grand daughters wedding\par pt states she is depressed - trying 5 mg lexapro\par check labs again\par r/o UTI\par \par 12/6/22\par will check labs\par will check xrays ls spine and pelvis

## 2022-12-06 NOTE — HISTORY OF PRESENT ILLNESS
[One fall no injury in past year] : Patient reported one fall in the past year without injury [Walker] : walker [FreeTextEntry1] : pt is 89 year old alert lady lives with daughter\par DM, htn\par pelvic fracture 4/2017\par \par some left groin pain -\par relief from tylenol\par \par reviewed all labs from october\par hba1c 5.9\par \par eats donuts\par does not check sugar\par \par 5/14/19\par pt has been well\par c/o lbp - radiates down left leg\par uses tylenol\par interested in acupuncture\par 99 on monday\par noncompliant with diet\par very alert\par \par 9/17/19\par pain left leg radiates to lower back\par stiff, tingling hands - fingers lock\par pt wakes at 2 am tylenol worn off\par take 2 bid - urged to increase\par saw Dr Gordillo\par He ordered MR L spine\par left hip xray\par grand daughter PT\par \par 12/3/19\par pt had been feeling unwell - had some chest pain\par stumbled ?fell - went to urgent care - then came here to f/up\par came in for urgent appt - found to have hb 7.7\par came to ER - got a unit of blood - blood in stool\par prob UGI bleed\par ? re endoscopy \par did not have it done\par gave her pantaprozole\par \par off glipizde and asa\par \par no abd pain\par one black stool\par now they are brown again\par no more chest pain\par \par 12/17/19\par knee and back pain\par no bleeding\par got PT\par \par hit corner of dressor to rt ribs\par \par 2/25/20\par takes ulcer meds\par can have tomato sauce if  no pain\par off DM meds\par no blood in stool\par left leg hurts\par low back pain\par \par 4/28/20\par \par called [phone]\par back/hip pains\par in in [facility] with family bc of covid\par likes cakes\par \par one day was weak - fsg - after eating donut - 184\par paramedics checked her and she was ok\par \par BP checked once - will do again and send me \par \par 5/18/21\par pt has been in [place] during covid\par first visit in a year\par vaccinated against covid in april \par was in ER 2 weeks ago in [place] - dehydrated - got bolus fluids\par \par 7/27/21\par pt has been well\par concern re labile BP\par got new cuff - correlates with our readings\par today 146/77 by her machine\par 148/72 by ours\par saw Dr Suggs - did not di echo to w/up systolic murmur\par no symptoms \par no cp ,sob, syncope\par walks with walker\par presently losartan 25 mg qam\par toprol 25  two in am and one in pm\par nifedipine ER 30 mg qhs\par \par last labs May - 2021\par \par pt is very alert\par uses walker\par \par 9/28/21\par has been well\par needs labs\par flu vaccine\par \par BP has been ok\par once heart rate went to 40s\par no dizziness\par dizzy if gets up fast\par does not drink much fluids\par gatorade/water\par incontinence worsened\par continous malodorus urine\par wants to see urogyn - \par \par 12/14/21\par back from rehab at [place]\par he had multiple fractures\par she is well\par c/o ears clogged\par to get booster on thursday\par \par rt shoulder\par rt hand\par fingers\par pelvis\par all fractured\par \par

## 2022-12-07 LAB
ALBUMIN SERPL ELPH-MCNC: 4.2 G/DL
ALP BLD-CCNC: 133 U/L
ALT SERPL-CCNC: 11 U/L
ANION GAP SERPL CALC-SCNC: 16 MMOL/L
AST SERPL-CCNC: 16 U/L
BASOPHILS # BLD AUTO: 0.06 K/UL
BASOPHILS NFR BLD AUTO: 0.6 %
BILIRUB SERPL-MCNC: 0.3 MG/DL
BUN SERPL-MCNC: 28 MG/DL
CALCIUM SERPL-MCNC: 9.7 MG/DL
CHLORIDE SERPL-SCNC: 100 MMOL/L
CO2 SERPL-SCNC: 22 MMOL/L
CREAT SERPL-MCNC: 1.17 MG/DL
EGFR: 41 ML/MIN/1.73M2
EOSINOPHIL # BLD AUTO: 0.05 K/UL
EOSINOPHIL NFR BLD AUTO: 0.5 %
ESTIMATED AVERAGE GLUCOSE: 134 MG/DL
GLUCOSE SERPL-MCNC: 189 MG/DL
HBA1C MFR BLD HPLC: 6.3 %
HCT VFR BLD CALC: 41.6 %
HGB BLD-MCNC: 13.7 G/DL
IMM GRANULOCYTES NFR BLD AUTO: 0.5 %
LYMPHOCYTES # BLD AUTO: 2.17 K/UL
LYMPHOCYTES NFR BLD AUTO: 22.7 %
MAN DIFF?: NORMAL
MCHC RBC-ENTMCNC: 32.9 GM/DL
MCHC RBC-ENTMCNC: 32.9 PG
MCV RBC AUTO: 100 FL
MONOCYTES # BLD AUTO: 0.8 K/UL
MONOCYTES NFR BLD AUTO: 8.4 %
NEUTROPHILS # BLD AUTO: 6.41 K/UL
NEUTROPHILS NFR BLD AUTO: 67.3 %
PLATELET # BLD AUTO: 307 K/UL
POTASSIUM SERPL-SCNC: 4.5 MMOL/L
PROT SERPL-MCNC: 7.3 G/DL
RBC # BLD: 4.16 M/UL
RBC # FLD: 13.7 %
SODIUM SERPL-SCNC: 139 MMOL/L
WBC # FLD AUTO: 9.54 K/UL

## 2022-12-08 ENCOUNTER — TRANSCRIPTION ENCOUNTER (OUTPATIENT)
Age: 87
End: 2022-12-08

## 2022-12-09 ENCOUNTER — NON-APPOINTMENT (OUTPATIENT)
Age: 87
End: 2022-12-09

## 2023-02-20 ENCOUNTER — APPOINTMENT (OUTPATIENT)
Dept: GERIATRICS | Facility: HOME HEALTH | Age: 88
End: 2023-02-20

## 2023-03-09 ENCOUNTER — APPOINTMENT (OUTPATIENT)
Dept: GERIATRICS | Facility: HOME HEALTH | Age: 88
End: 2023-03-09

## 2023-04-20 ENCOUNTER — APPOINTMENT (OUTPATIENT)
Dept: GERIATRICS | Facility: HOME HEALTH | Age: 88
End: 2023-04-20
Payer: MEDICARE

## 2023-04-20 VITALS
SYSTOLIC BLOOD PRESSURE: 140 MMHG | OXYGEN SATURATION: 96 % | HEART RATE: 54 BPM | TEMPERATURE: 97.2 F | DIASTOLIC BLOOD PRESSURE: 65 MMHG

## 2023-04-20 DIAGNOSIS — H90.3 SENSORINEURAL HEARING LOSS, BILATERAL: ICD-10-CM

## 2023-04-20 PROCEDURE — 99349 HOME/RES VST EST MOD MDM 40: CPT

## 2023-04-20 NOTE — ASSESSMENT
[FreeTextEntry1] : repeat labs in 3 months\par renewed meds\par \par called fla - await news on pneumonia vaccine\par \par 5/14/19\par left ear cleaned of cerumen\par check labs\par refer to pain/acupuncture drs\par \par 9/17/19\par pt going for MRI spine and hip films today\par from Dr Fernandez\par encouraged increase tylenol to 1000 mg tid\par labs fine in may - no need to repeat today\par hand exercises\par lives with grand daughter - a PT\par clipped toe nails\par cleaned ears\par gave flu vaccine\par \par 12/3/19\par avoid nsaids\par repeat labs\par cont pantaprozole\par f/up 2 weeks\par \par 12/17/19\par cont off DM meds\par recheck labs\par f.up feb if stable\par \par 2/25/20\par checking labs today\par pain management for back\par \par 5/18/21\par pt had episode of weakness - two weeks ago\par responded to IV fluids\par pt has loud systolic mumur - which is louder than last visit\par with sinus arrythmia\par will arrange cardiac eval\par \par 7/27/21\par last few days BP in 140s\par will continue to monitor on same meds\par did not change today\par as going well with new cuff and meds\par \par 9/28/21\par LE edema - dependent - needs to elevate\par lungs- clear\par BP better\par check labs\par flu vaccine     given\par cont same meds\par to get echo and cardiac check up\par \par 3/22/22\par decrease metoprolol 25 bid (from 2 in am)\par doing PT and OT\par reviewed labs and gave  copy\par check BP at home\par alert, not depressed\par \par 6/7/22\par [place] - suggested amplifier\par going to grand daughters wedding\par pt states she is depressed - trying 5 mg lexapro\par check labs again\par r/o UTI\par \par 12/6/22\par will check labs\par will check xrays ls spine and pelvis\par \par \par 4/20/23\par pt is doing well\par very alert\par pam controlled\par will check labs at home\par will sono rt groin - mass is mobille - doubt malignant\par has meds\par trial 1/2 ativan if agitated\par kleep  eye on scalp

## 2023-04-20 NOTE — HISTORY OF PRESENT ILLNESS
[One fall no injury in past year] : Patient reported one fall in the past year without injury [Walker] : walker [FreeTextEntry1] : pt is 89 year old alert lady lives with daughter\par DM, htn\par pelvic fracture 4/2017\par \par some left groin pain -\par relief from tylenol\par \par reviewed all labs from october\par hba1c 5.9\par \par eats donuts\par does not check sugar\par \par 5/14/19\par pt has been well\par c/o lbp - radiates down left leg\par uses tylenol\par interested in acupuncture\par 99 on monday\par noncompliant with diet\par very alert\par \par 9/17/19\par pain left leg radiates to lower back\par stiff, tingling hands - fingers lock\par pt wakes at 2 am tylenol worn off\par take 2 bid - urged to increase\par saw Dr Gordillo\par He ordered MR L spine\par left hip xray\par grand daughter PT\par \par 12/3/19\par pt had been feeling unwell - had some chest pain\par stumbled ?fell - went to urgent care - then came here to f/up\par came in for urgent appt - found to have hb 7.7\par came to ER - got a unit of blood - blood in stool\par prob UGI bleed\par ? re endoscopy \par did not have it done\par gave her pantaprozole\par \par off glipizde and asa\par \par no abd pain\par one black stool\par now they are brown again\par no more chest pain\par \par 12/17/19\par knee and back pain\par no bleeding\par got PT\par \par hit corner of dressor to rt ribs\par \par 2/25/20\par takes ulcer meds\par can have tomato sauce if  no pain\par off DM meds\par no blood in stool\par left leg hurts\par low back pain\par \par 4/28/20\par \par called [phone]\par back/hip pains\par in in [facility] with family bc of covid\par likes cakes\par \par one day was weak - fsg - after eating donut - 184\par paramedics checked her and she was ok\par \par BP checked once - will do again and send me \par \par 5/18/21\par pt has been in [place] during covid\par first visit in a year\par vaccinated against covid in april \par was in ER 2 weeks ago in [place] - dehydrated - got bolus fluids\par \par 7/27/21\par pt has been well\par concern re labile BP\par got new cuff - correlates with our readings\par today 146/77 by her machine\par 148/72 by ours\par saw Dr Suggs - did not di echo to w/up systolic murmur\par no symptoms \par no cp ,sob, syncope\par walks with walker\par presently losartan 25 mg qam\par toprol 25  two in am and one in pm\par nifedipine ER 30 mg qhs\par \par last labs May - 2021\par \par pt is very alert\par uses walker\par \par 9/28/21\par has been well\par needs labs\par flu vaccine\par \par BP has been ok\par once heart rate went to 40s\par no dizziness\par dizzy if gets up fast\par does not drink much fluids\par gatorade/water\par incontinence worsened\par continous malodorus urine\par wants to see urogyn - \par \par 12/14/21\par back from rehab at [place]\par he had multiple fractures\par she is well\par c/o ears clogged\par to get booster on thursday\par \par rt shoulder\par rt hand\par fingers\par pelvis\par all fractured\par \par

## 2023-04-20 NOTE — REVIEW OF SYSTEMS
[Loss Of Hearing] : hearing loss [Lower Ext Edema] : lower extremity edema [Arthralgias] : arthralgias [Negative] : Heme/Lymph [Recent Weight Loss (___ Lbs)] : no recent weight loss [FreeTextEntry2] : eating [FreeTextEntry5] : trace ankle edema

## 2023-04-20 NOTE — PHYSICAL EXAM
[Alert] : alert [Well Nourished] : well nourished [Well Developed] : well developed [PERRL] : pupils were equal in size, round, and reactive to light [No Respiratory Distress] : no respiratory distress [No Acc Muscle Use] : no accessory muscle use [Respiration, Rhythm And Depth] : normal respiratory rhythm and effort [Normal] : no focal deficits [General Appearance - Alert] : alert [General Appearance - In No Acute Distress] : in no acute distress [General Appearance - Well Nourished] : well nourished [General Appearance - Well Developed] : well developed [General Appearance - Well-Appearing] : healthy appearing [Sclera] : the sclera and conjunctiva were normal [Extraocular Movements] : extraocular movements were intact [Normal Oral Mucosa] : normal oral mucosa [No Oral Pallor] : no oral pallor [No Oral Cyanosis] : no oral cyanosis [Outer Ear] : the ears and nose were normal in appearance [Oropharynx] : The oropharynx was normal [Neck Appearance] : the appearance of the neck was normal [Neck Cervical Mass (___cm)] : no neck mass was observed [Jugular Venous Distention Increased] : there was no jugular-venous distention [Auscultation Breath Sounds / Voice Sounds] : lungs were clear to auscultation bilaterally [Heart Rate And Rhythm] : heart rate was normal and rhythm regular [Heart Sounds] : normal S1 and S2 [Heart Sounds Gallop] : no gallops [Heart Sounds Pericardial Friction Rub] : no pericardial rub [Bowel Sounds] : normal bowel sounds [Abdomen Soft] : soft [Abdomen Tenderness] : non-tender [Abdomen Mass (___ Cm)] : no abdominal mass palpated [Cervical Lymph Nodes Enlarged Anterior Bilaterally] : anterior cervical [No CVA Tenderness] : no ~M costovertebral angle tenderness [No Spinal Tenderness] : no spinal tenderness [Involuntary Movements] : no involuntary movements were seen [Skin Color & Pigmentation] : normal skin color and pigmentation [Skin Turgor] : normal skin turgor [] : no rash [Sensation] : the sensory exam was normal to light touch and pinprick [No Focal Deficits] : no focal deficits [Edema] : edema was not present [de-identified] : a [de-identified] : s1s2 rrr 2-3/6 systolic murmur [de-identified] : mobile 4 cm irregular firm not fixed rt groin [de-identified] : finger locked rt and left  hand [de-identified] : good rom knees -  buttocks clear [FreeTextEntry1] : pt is completely alert

## 2023-04-21 ENCOUNTER — LABORATORY RESULT (OUTPATIENT)
Age: 88
End: 2023-04-21

## 2023-04-24 ENCOUNTER — RESULT REVIEW (OUTPATIENT)
Age: 88
End: 2023-04-24

## 2023-04-25 ENCOUNTER — APPOINTMENT (OUTPATIENT)
Dept: GERIATRICS | Facility: CLINIC | Age: 88
End: 2023-04-25

## 2023-06-06 ENCOUNTER — LABORATORY RESULT (OUTPATIENT)
Age: 88
End: 2023-06-06

## 2023-06-06 ENCOUNTER — APPOINTMENT (OUTPATIENT)
Dept: GERIATRICS | Facility: CLINIC | Age: 88
End: 2023-06-06
Payer: MEDICARE

## 2023-06-06 VITALS
OXYGEN SATURATION: 98 % | WEIGHT: 83.8 LBS | SYSTOLIC BLOOD PRESSURE: 164 MMHG | HEART RATE: 63 BPM | DIASTOLIC BLOOD PRESSURE: 80 MMHG | TEMPERATURE: 96.4 F | BODY MASS INDEX: 18.13 KG/M2

## 2023-06-06 VITALS — SYSTOLIC BLOOD PRESSURE: 145 MMHG | DIASTOLIC BLOOD PRESSURE: 75 MMHG

## 2023-06-06 DIAGNOSIS — F41.9 ANXIETY DISORDER, UNSPECIFIED: ICD-10-CM

## 2023-06-06 DIAGNOSIS — F32.A ANXIETY DISORDER, UNSPECIFIED: ICD-10-CM

## 2023-06-06 PROCEDURE — 99214 OFFICE O/P EST MOD 30 MIN: CPT

## 2023-06-06 NOTE — HISTORY OF PRESENT ILLNESS
[One fall no injury in past year] : Patient reported one fall in the past year without injury [Walker] : walker [FreeTextEntry1] : pt is 89 year old alert lady lives with daughter\par DM, htn\par pelvic fracture 4/2017\par \par some left groin pain -\par relief from tylenol\par \par reviewed all labs from october\par hba1c 5.9\par \par eats donuts\par does not check sugar\par \par 5/14/19\par pt has been well\par c/o lbp - radiates down left leg\par uses tylenol\par interested in acupuncture\par 99 on monday\par noncompliant with diet\par very alert\par \par 9/17/19\par pain left leg radiates to lower back\par stiff, tingling hands - fingers lock\par pt wakes at 2 am tylenol worn off\par take 2 bid - urged to increase\par saw Dr Gordillo\par He ordered MR L spine\par left hip xray\par grand daughter PT\par \par 12/3/19\par pt had been feeling unwell - had some chest pain\par stumbled ?fell - went to urgent care - then came here to f/up\par came in for urgent appt - found to have hb 7.7\par came to ER - got a unit of blood - blood in stool\par prob UGI bleed\par ? re endoscopy \par did not have it done\par gave her pantaprozole\par \par off glipizde and asa\par \par no abd pain\par one black stool\par now they are brown again\par no more chest pain\par \par 12/17/19\par knee and back pain\par no bleeding\par got PT\par \par hit corner of dressor to rt ribs\par \par 2/25/20\par takes ulcer meds\par can have tomato sauce if  no pain\par off DM meds\par no blood in stool\par left leg hurts\par low back pain\par \par 4/28/20\par \par called [phone]\par back/hip pains\par in in [facility] with family bc of covid\par likes cakes\par \par one day was weak - fsg - after eating donut - 184\par paramedics checked her and she was ok\par \par BP checked once - will do again and send me \par \par 5/18/21\par pt has been in [place] during covid\par first visit in a year\par vaccinated against covid in april \par was in ER 2 weeks ago in [place] - dehydrated - got bolus fluids\par \par 7/27/21\par pt has been well\par concern re labile BP\par got new cuff - correlates with our readings\par today 146/77 by her machine\par 148/72 by ours\par saw Dr Suggs - did not di echo to w/up systolic murmur\par no symptoms \par no cp ,sob, syncope\par walks with walker\par presently losartan 25 mg qam\par toprol 25  two in am and one in pm\par nifedipine ER 30 mg qhs\par \par last labs May - 2021\par \par pt is very alert\par uses walker\par \par 9/28/21\par has been well\par needs labs\par flu vaccine\par \par BP has been ok\par once heart rate went to 40s\par no dizziness\par dizzy if gets up fast\par does not drink much fluids\par gatorade/water\par incontinence worsened\par continous malodorus urine\par wants to see urogyn - \par \par 12/14/21\par back from rehab at [place]\par he had multiple fractures\par she is well\par c/o ears clogged\par to get booster on thursday\par \par rt shoulder\par rt hand\par fingers\par pelvis\par all fractured\par \par

## 2023-06-06 NOTE — ASSESSMENT
[FreeTextEntry1] : repeat labs in 3 months\par renewed meds\par \par called fla - await news on pneumonia vaccine\par \par 5/14/19\par left ear cleaned of cerumen\par check labs\par refer to pain/acupuncture drs\par \par 9/17/19\par pt going for MRI spine and hip films today\par from Dr Fernandez\par encouraged increase tylenol to 1000 mg tid\par labs fine in may - no need to repeat today\par hand exercises\par lives with grand daughter - a PT\par clipped toe nails\par cleaned ears\par gave flu vaccine\par \par 12/3/19\par avoid nsaids\par repeat labs\par cont pantaprozole\par f/up 2 weeks\par \par 12/17/19\par cont off DM meds\par recheck labs\par f.up feb if stable\par \par 2/25/20\par checking labs today\par pain management for back\par \par 5/18/21\par pt had episode of weakness - two weeks ago\par responded to IV fluids\par pt has loud systolic mumur - which is louder than last visit\par with sinus arrythmia\par will arrange cardiac eval\par \par 7/27/21\par last few days BP in 140s\par will continue to monitor on same meds\par did not change today\par as going well with new cuff and meds\par \par 9/28/21\par LE edema - dependent - needs to elevate\par lungs- clear\par BP better\par check labs\par flu vaccine     given\par cont same meds\par to get echo and cardiac check up\par \par 3/22/22\par decrease metoprolol 25 bid (from 2 in am)\par doing PT and OT\par reviewed labs and gave  copy\par check BP at home\par alert, not depressed\par \par 6/7/22\par [place] - suggested amplifier\par going to grand daughters wedding\par pt states she is depressed - trying 5 mg lexapro\par check labs again\par r/o UTI\par \par 12/6/22\par will check labs\par will check xrays ls spine and pelvis\par \par \par 4/20/23\par pt is doing well\par very alert\par pam controlled\par will check labs at home\par will sono rt groin - mass is mobille - doubt malignant\par has meds\par trial 1/2 ativan if agitated\par keep  eye on scalp\par \par 6/6/23\par trial 6.25 mg seroquel\par check urine\par ok right now

## 2023-06-06 NOTE — PHYSICAL EXAM
Please send to [facility] [Alert] : alert [Well Nourished] : well nourished [Well Developed] : well developed [PERRL] : pupils were equal in size, round, and reactive to light [No Respiratory Distress] : no respiratory distress [No Acc Muscle Use] : no accessory muscle use [Respiration, Rhythm And Depth] : normal respiratory rhythm and effort [Edema] : edema was not present [Normal] : no focal deficits [General Appearance - Alert] : alert [General Appearance - In No Acute Distress] : in no acute distress [General Appearance - Well Nourished] : well nourished [General Appearance - Well Developed] : well developed [General Appearance - Well-Appearing] : healthy appearing [Sclera] : the sclera and conjunctiva were normal [Extraocular Movements] : extraocular movements were intact [Normal Oral Mucosa] : normal oral mucosa [No Oral Pallor] : no oral pallor [No Oral Cyanosis] : no oral cyanosis [Outer Ear] : the ears and nose were normal in appearance [Oropharynx] : The oropharynx was normal [Neck Appearance] : the appearance of the neck was normal [Neck Cervical Mass (___cm)] : no neck mass was observed [Jugular Venous Distention Increased] : there was no jugular-venous distention [Auscultation Breath Sounds / Voice Sounds] : lungs were clear to auscultation bilaterally [Heart Rate And Rhythm] : heart rate was normal and rhythm regular [Heart Sounds] : normal S1 and S2 [Heart Sounds Gallop] : no gallops [Heart Sounds Pericardial Friction Rub] : no pericardial rub [Bowel Sounds] : normal bowel sounds [Abdomen Soft] : soft [Abdomen Tenderness] : non-tender [Abdomen Mass (___ Cm)] : no abdominal mass palpated [Cervical Lymph Nodes Enlarged Anterior Bilaterally] : anterior cervical [No CVA Tenderness] : no ~M costovertebral angle tenderness [No Spinal Tenderness] : no spinal tenderness [Involuntary Movements] : no involuntary movements were seen [Skin Color & Pigmentation] : normal skin color and pigmentation [Skin Turgor] : normal skin turgor [] : no rash [Sensation] : the sensory exam was normal to light touch and pinprick [No Focal Deficits] : no focal deficits [de-identified] : s1s2 rrr 2-3/6 systolic murmur [de-identified] : mobile 4 cm irregular firm not fixed rt groin [de-identified] : finger locked rt and left  hand [de-identified] : good rom knees -  buttocks clear [FreeTextEntry1] : pt is completely alert

## 2023-06-06 NOTE — REVIEW OF SYSTEMS
[Loss Of Hearing] : hearing loss [Lower Ext Edema] : lower extremity edema [Arthralgias] : arthralgias [Negative] : Heme/Lymph [Recent Weight Loss (___ Lbs)] : no recent weight loss [FreeTextEntry2] : eating [FreeTextEntry5] : trace ankle edema [de-identified] : hallucinating at times

## 2023-06-07 LAB
APPEARANCE: CLEAR
BILIRUBIN URINE: NEGATIVE
BLOOD URINE: NEGATIVE
COLOR: YELLOW
GLUCOSE QUALITATIVE U: NEGATIVE MG/DL
KETONES URINE: NEGATIVE MG/DL
LEUKOCYTE ESTERASE URINE: NEGATIVE
NITRITE URINE: NEGATIVE
PH URINE: 6.5
PROTEIN URINE: 30 MG/DL
SPECIFIC GRAVITY URINE: 1.01
UROBILINOGEN URINE: 1 MG/DL

## 2023-06-13 ENCOUNTER — TRANSCRIPTION ENCOUNTER (OUTPATIENT)
Age: 88
End: 2023-06-13

## 2023-06-14 ENCOUNTER — NON-APPOINTMENT (OUTPATIENT)
Age: 88
End: 2023-06-14

## 2023-06-29 ENCOUNTER — APPOINTMENT (OUTPATIENT)
Dept: GERIATRICS | Facility: HOME HEALTH | Age: 88
End: 2023-06-29

## 2023-07-12 ENCOUNTER — APPOINTMENT (OUTPATIENT)
Dept: GERIATRICS | Facility: HOME HEALTH | Age: 88
End: 2023-07-12

## 2023-07-20 ENCOUNTER — NON-APPOINTMENT (OUTPATIENT)
Age: 88
End: 2023-07-20

## 2023-08-04 ENCOUNTER — APPOINTMENT (OUTPATIENT)
Dept: GERIATRICS | Facility: HOME HEALTH | Age: 88
End: 2023-08-04
Payer: MEDICARE

## 2023-08-04 VITALS
TEMPERATURE: 97.8 F | DIASTOLIC BLOOD PRESSURE: 70 MMHG | OXYGEN SATURATION: 100 % | SYSTOLIC BLOOD PRESSURE: 138 MMHG | HEART RATE: 64 BPM

## 2023-08-04 DIAGNOSIS — R19.09 OTHER INTRA-ABDOMINAL AND PELVIC SWELLING, MASS AND LUMP: ICD-10-CM

## 2023-08-04 PROCEDURE — 99349 HOME/RES VST EST MOD MDM 40: CPT

## 2023-08-04 RX ORDER — LORAZEPAM 0.5 MG/1
0.5 TABLET ORAL
Qty: 10 | Refills: 0 | Status: DISCONTINUED | COMMUNITY
Start: 2023-04-20 | End: 2023-08-04

## 2023-08-04 RX ORDER — QUETIAPINE FUMARATE 25 MG/1
25 TABLET ORAL AT BEDTIME
Qty: 15 | Refills: 5 | Status: DISCONTINUED | COMMUNITY
Start: 2023-06-06 | End: 2023-08-04

## 2023-08-04 NOTE — HISTORY OF PRESENT ILLNESS
[One fall no injury in past year] : Patient reported one fall in the past year without injury [Walker] : walker [FreeTextEntry1] : pt is 89 year old alert lady lives with daughter\par  DM, htn\par  pelvic fracture 4/2017\par  \par  some left groin pain -\par  relief from tylenol\par  \par  reviewed all labs from october\par  hba1c 5.9\par  \par  eats donuts\par  does not check sugar\par  \par  5/14/19\par  pt has been well\par  c/o lbp - radiates down left leg\par  uses tylenol\par  interested in acupuncture\par  99 on monday\par  noncompliant with diet\par  very alert\par  \par  9/17/19\par  pain left leg radiates to lower back\par  stiff, tingling hands - fingers lock\par  pt wakes at 2 am tylenol worn off\par  take 2 bid - urged to increase\par  saw Dr Gordillo\par  He ordered MR L spine\par  left hip xray\par  grand daughter PT\par  \par  12/3/19\par  pt had been feeling unwell - had some chest pain\par  stumbled ?fell - went to urgent care - then came here to f/up\par  came in for urgent appt - found to have hb 7.7\par  came to ER - got a unit of blood - blood in stool\par  prob UGI bleed\par  ? re endoscopy \par  did not have it done\par  gave her pantaprozole\par  \par  off glipizde and asa\par  \par  no abd pain\par  one black stool\par  now they are brown again\par  no more chest pain\par  \par  12/17/19\par  knee and back pain\par  no bleeding\par  got PT\par  \par  hit corner of dressor to rt ribs\par  \par  2/25/20\par  takes ulcer meds\par  can have tomato sauce if  no pain\par  off DM meds\par  no blood in stool\par  left leg hurts\par  low back pain\par  \par  4/28/20\par  \par  called [phone]\par  back/hip pains\par  in in [facility] with family bc of covid\par  likes cakes\par  \par  one day was weak - fsg - after eating donut - 184\par  paramedics checked her and she was ok\par  \par  BP checked once - will do again and send me \par  \par  5/18/21\par  pt has been in [place] during covid\par  first visit in a year\par  vaccinated against covid in april \par  was in ER 2 weeks ago in [place] - dehydrated - got bolus fluids\par  \par  7/27/21\par  pt has been well\par  concern re labile BP\par  got new cuff - correlates with our readings\par  today 146/77 by her machine\par  148/72 by ours\par  saw Dr Suggs - did not di echo to w/up systolic murmur\par  no symptoms \par  no cp ,sob, syncope\par  walks with walker\par  presently losartan 25 mg qam\par  toprol 25  two in am and one in pm\par  nifedipine ER 30 mg qhs\par  \par  last labs May - 2021\par  \par  pt is very alert\par  uses walker\par  \par  9/28/21\par  has been well\par  needs labs\par  flu vaccine\par  \par  BP has been ok\par  once heart rate went to 40s\par  no dizziness\par  dizzy if gets up fast\par  does not drink much fluids\par  gatorade/water\par  incontinence worsened\par  continous malodorus urine\par  wants to see urogyn - \par  \par  12/14/21\par  back from rehab at [place]\par  he had multiple fractures\par  she is well\par  c/o ears clogged\par  to get booster on thursday\par  \par  rt shoulder\par  rt hand\par  fingers\par  pelvis\par  all fractured\par  \par

## 2023-08-04 NOTE — PHYSICAL EXAM
[Alert] : alert [Well Nourished] : well nourished [Well Developed] : well developed [PERRL] : pupils were equal in size, round, and reactive to light [No Respiratory Distress] : no respiratory distress [No Acc Muscle Use] : no accessory muscle use [Respiration, Rhythm And Depth] : normal respiratory rhythm and effort [Edema] : edema was not present [External Female Genitalia] : normal external genitalia [Normal] : no focal deficits [Normal Affect] : the affect was normal [Normal Mood] : the mood was normal [de-identified] : frail in wheel chair - awake [de-identified] : s1s2 rrr 2-3/6 systolic murmur [de-identified] : mobile 4 cm irregular firm not fixed rt groin - unchanged - skin clear on buttocls [de-identified] : has lidocaine patch lower back [de-identified] : finger locked rt and left  hand, needs two person assist to stand [de-identified] : good rom knees -  buttocks clear [General Appearance - Alert] : alert [General Appearance - In No Acute Distress] : in no acute distress [General Appearance - Well Nourished] : well nourished [General Appearance - Well-Appearing] : healthy appearing [General Appearance - Well Developed] : well developed [Sclera] : the sclera and conjunctiva were normal [Extraocular Movements] : extraocular movements were intact [Normal Oral Mucosa] : normal oral mucosa [No Oral Pallor] : no oral pallor [No Oral Cyanosis] : no oral cyanosis [Outer Ear] : the ears and nose were normal in appearance [Oropharynx] : The oropharynx was normal [Neck Appearance] : the appearance of the neck was normal [Neck Cervical Mass (___cm)] : no neck mass was observed [Jugular Venous Distention Increased] : there was no jugular-venous distention [Auscultation Breath Sounds / Voice Sounds] : lungs were clear to auscultation bilaterally [Heart Rate And Rhythm] : heart rate was normal and rhythm regular [Heart Sounds] : normal S1 and S2 [Heart Sounds Gallop] : no gallops [Heart Sounds Pericardial Friction Rub] : no pericardial rub [Bowel Sounds] : normal bowel sounds [Abdomen Soft] : soft [Abdomen Tenderness] : non-tender [Abdomen Mass (___ Cm)] : no abdominal mass palpated [Cervical Lymph Nodes Enlarged Anterior Bilaterally] : anterior cervical [No CVA Tenderness] : no ~M costovertebral angle tenderness [No Spinal Tenderness] : no spinal tenderness [Involuntary Movements] : no involuntary movements were seen [Skin Color & Pigmentation] : normal skin color and pigmentation [Skin Turgor] : normal skin turgor [] : no rash [Sensation] : the sensory exam was normal to light touch and pinprick [No Focal Deficits] : no focal deficits [FreeTextEntry1] : pt is completely alert

## 2023-08-04 NOTE — REVIEW OF SYSTEMS
[Recent Weight Loss (___ Lbs)] : no recent weight loss [Loss Of Hearing] : hearing loss [Lower Ext Edema] : lower extremity edema [Incontinence] : incontinence [Arthralgias] : arthralgias [Confused] : confusion [Limb Weakness] : limb weakness [Difficulty Walking] : difficulty walking [Negative] : Heme/Lymph [FreeTextEntry2] : eating well , back hurts [FreeTextEntry9] : gait disorder

## 2023-08-04 NOTE — ASSESSMENT
[FreeTextEntry1] : repeat labs in 3 months renewed meds  called [facility] - await news on pneumonia vaccine  5/14/19 left ear cleaned of cerumen check labs refer to pain/acupuncture drs  9/17/19 pt going for MRI spine and hip films today from Dr Fernandez encouraged increase tylenol to 1000 mg tid labs fine in may - no need to repeat today hand exercises lives with grand daughter - a PT clipped toe nails cleaned ears gave flu vaccine  12/3/19 avoid nsaids repeat labs cont pantaprozole f/up 2 weeks  12/17/19 cont off DM meds recheck labs f.up feb if stable  2/25/20 checking labs today pain management for back  5/18/21 pt had episode of weakness - two weeks ago responded to IV fluids pt has loud systolic mumur - which is louder than last visit with sinus arrythmia will arrange cardiac eval  7/27/21 last few days BP in 140s will continue to monitor on same meds did not change today as going well with new cuff and meds  9/28/21 LE edema - dependent - needs to elevate lungs- clear BP better check labs flu vaccine     given cont same meds to get echo and cardiac check up  3/22/22 decrease metoprolol 25 bid (from 2 in am) doing PT and OT reviewed labs and gave  copy check BP at home alert, not depressed  6/7/22 [facility] - suggested amplifier going to grand daughters wedding pt states she is depressed - trying 5 mg lexapro check labs again r/o UTI  12/6/22 will check labs will check xrays ls spine and pelvis   4/20/23 pt is doing well very alert pam controlled will check labs at home will sono rt groin - mass is mobille - doubt malignant has meds trial 1/2 ativan if agitated keep  eye on scalp  6/6/23 trial 6.25 mg seroquel check urine ok right now  8/4/23 pt is just back from rehab she is frail unable to stand eats well no more hallucinations BP is wnl renewed meds to get home PT some back pain - tylenol and lidocaine patches

## 2023-09-14 ENCOUNTER — APPOINTMENT (OUTPATIENT)
Dept: GERIATRICS | Facility: HOME HEALTH | Age: 88
End: 2023-09-14
Payer: MEDICARE

## 2023-09-14 DIAGNOSIS — Z23 ENCOUNTER FOR IMMUNIZATION: ICD-10-CM

## 2023-09-14 PROCEDURE — G0008: CPT

## 2023-09-14 PROCEDURE — 99349 HOME/RES VST EST MOD MDM 40: CPT | Mod: 25

## 2023-09-14 PROCEDURE — 90662 IIV NO PRSV INCREASED AG IM: CPT

## 2023-09-15 VITALS — DIASTOLIC BLOOD PRESSURE: 70 MMHG | OXYGEN SATURATION: 96 % | HEART RATE: 54 BPM | SYSTOLIC BLOOD PRESSURE: 138 MMHG

## 2023-09-15 PROBLEM — Z23 ENCOUNTER FOR IMMUNIZATION: Status: ACTIVE | Noted: 2021-09-28

## 2023-09-20 ENCOUNTER — LABORATORY RESULT (OUTPATIENT)
Age: 88
End: 2023-09-20

## 2023-09-21 ENCOUNTER — LABORATORY RESULT (OUTPATIENT)
Age: 88
End: 2023-09-21

## 2023-11-03 ENCOUNTER — APPOINTMENT (OUTPATIENT)
Dept: GERIATRICS | Facility: CLINIC | Age: 88
End: 2023-11-03
Payer: MEDICARE

## 2023-11-03 VITALS
SYSTOLIC BLOOD PRESSURE: 152 MMHG | WEIGHT: 85 LBS | OXYGEN SATURATION: 97 % | BODY MASS INDEX: 18.39 KG/M2 | DIASTOLIC BLOOD PRESSURE: 70 MMHG | HEART RATE: 73 BPM | TEMPERATURE: 96.5 F

## 2023-11-03 DIAGNOSIS — F51.5 NIGHTMARE DISORDER: ICD-10-CM

## 2023-11-03 PROCEDURE — 99215 OFFICE O/P EST HI 40 MIN: CPT

## 2023-12-01 RX ORDER — METOPROLOL SUCCINATE 25 MG/1
25 TABLET, EXTENDED RELEASE ORAL
Qty: 180 | Refills: 3 | Status: ACTIVE | COMMUNITY
Start: 2020-02-27 | End: 1900-01-01

## 2024-01-03 ENCOUNTER — APPOINTMENT (OUTPATIENT)
Dept: GERIATRICS | Facility: CLINIC | Age: 89
End: 2024-01-03
Payer: MEDICARE

## 2024-01-03 VITALS
HEART RATE: 67 BPM | TEMPERATURE: 96.2 F | DIASTOLIC BLOOD PRESSURE: 70 MMHG | SYSTOLIC BLOOD PRESSURE: 144 MMHG | WEIGHT: 79.6 LBS | OXYGEN SATURATION: 98 % | BODY MASS INDEX: 17.23 KG/M2

## 2024-01-03 DIAGNOSIS — R63.4 ABNORMAL WEIGHT LOSS: ICD-10-CM

## 2024-01-03 DIAGNOSIS — H60.502 UNSPECIFIED ACUTE NONINFECTIVE OTITIS EXTERNA, LEFT EAR: ICD-10-CM

## 2024-01-03 DIAGNOSIS — J30.9 ALLERGIC RHINITIS, UNSPECIFIED: ICD-10-CM

## 2024-01-03 PROCEDURE — 99215 OFFICE O/P EST HI 40 MIN: CPT

## 2024-01-03 RX ORDER — CIPROFLOXACIN HYDROCHLORIDE AND HYDROCORTISONE 2; 10 MG/ML; MG/ML
0.2-1 SUSPENSION/ DROPS AURICULAR (OTIC) TWICE DAILY
Qty: 1 | Refills: 0 | Status: ACTIVE | COMMUNITY
Start: 2024-01-03 | End: 1900-01-01

## 2024-01-03 RX ORDER — IPRATROPIUM BROMIDE 21 UG/1
0.03 SPRAY NASAL 3 TIMES DAILY
Qty: 1 | Refills: 0 | Status: ACTIVE | COMMUNITY
Start: 2024-01-03 | End: 1900-01-01

## 2024-01-03 NOTE — HISTORY OF PRESENT ILLNESS
[FreeTextEntry1] : haivng symptoms of cough since last 2-3 weeks. not all the time. tries to bring up phlegm sporadically. Goes into a weak coughing fit. She has been having runny nose all her life. nose runs constantly.   no h/o allergies.  around zak crow was saying her nose was stuffed more than usual.  [place] daughter and nyasia son-in law here with her.   She has been losing weight. She doesn't really want to move during the day per family  no fever, sore throat.   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  She generally has 3 meals a day. misses once or twice a week.   Discussed that they should consider getting hearing aids but family states that patient does not want to pay for it

## 2024-01-03 NOTE — PHYSICAL EXAM
[Oropharynx] : the oropharynx was normal [No Oral Pallor] : no oral pallor [No Respiratory Distress] : no respiratory distress [No Acc Muscle Use] : no accessory muscle use [Respiration, Rhythm And Depth] : normal respiratory rhythm and effort [Auscultation Breath Sounds / Voice Sounds] : lungs were clear to auscultation bilaterally [Normal S1, S2] : normal S1 and S2 [Heart Rate And Rhythm] : heart rate was normal and rhythm regular [de-identified] : Left external ear canal has mild edema present.  No erythema, discharge seen.  Unable to visualize tympanic membrane. [de-identified] : Ejection systolic murmur present

## 2024-01-16 ENCOUNTER — RESULT REVIEW (OUTPATIENT)
Age: 89
End: 2024-01-16

## 2024-01-16 ENCOUNTER — APPOINTMENT (OUTPATIENT)
Dept: GERIATRICS | Facility: CLINIC | Age: 89
End: 2024-01-16
Payer: MEDICARE

## 2024-01-16 VITALS
TEMPERATURE: 96 F | OXYGEN SATURATION: 97 % | BODY MASS INDEX: 17.04 KG/M2 | WEIGHT: 79 LBS | DIASTOLIC BLOOD PRESSURE: 80 MMHG | SYSTOLIC BLOOD PRESSURE: 140 MMHG | HEIGHT: 57 IN | HEART RATE: 74 BPM

## 2024-01-16 DIAGNOSIS — R63.4 ABNORMAL WEIGHT LOSS: ICD-10-CM

## 2024-01-16 DIAGNOSIS — R05.1 ACUTE COUGH: ICD-10-CM

## 2024-01-16 DIAGNOSIS — R05.2 SUBACUTE COUGH: ICD-10-CM

## 2024-01-16 PROCEDURE — 99214 OFFICE O/P EST MOD 30 MIN: CPT

## 2024-01-16 NOTE — PHYSICAL EXAM
[Alert] : alert [Well Nourished] : well nourished [Well Developed] : well developed [Sclera] : the sclera and conjunctiva were normal [PERRL] : pupils were equal in size, round, and reactive to light [No Oral Pallor] : no oral pallor [No Oral Cyanosis] : no oral cyanosis [Oropharynx] : the oropharynx was normal [No Respiratory Distress] : no respiratory distress [Respiration, Rhythm And Depth] : normal respiratory rhythm and effort [Normal S1, S2] : normal S1 and S2 [Heart Rate And Rhythm] : heart rate was normal and rhythm regular [Edema] : edema was not present [External Female Genitalia] : normal external genitalia [Normal] : no focal deficits [No Focal Deficits] : no focal deficits [Normal Affect] : the affect was normal [Normal Mood] : the mood was normal [de-identified] : frail in chair- awake - conversant, knows birthday [FreeTextEntry1] : able to read my badge and mouse pad [de-identified] : left ear + cerumen impaction some removed [de-identified] : crackles RLL [de-identified] : s1s2 rrr 2-3/6 systolic murmur [de-identified] : kyphosis

## 2024-01-16 NOTE — HISTORY OF PRESENT ILLNESS
[FreeTextEntry1] : pt is home from rehab after hospital admit with multiple fractures rt hand, shoulder arm, pelvis  she has made a great recovery at [place]  requests home pt and ot  [facility] on thursday  labs ordered  meds renewed  3/22/22  several episodes of acute confusion last weeek disoriented at times did not fall saw dots, noodles, people at various times once at 230 am  sometimes in the day had labs done at home - all wnl - except vit D 15 no UTI now fine four days eats v well not dizzy does not check BP at home  pt is alert can read my badge aware of current events knows re risk of WW3 reads paper  6/7/22 getting OT and Pt reluctant to do exercises on her own may have had blood in stool one day  eats a lot of tomato sauce urinates a lot gets confused - thought she saw something on rug black spots thought people at the door sometimes talks to people not there  12/6/22 [place] was in [place] - agitated in hospital then went to [facility] in [facility] for rehab 8 days ago fell in home tried to get home xrays - but unable poo appetite needs home pt and aide  4/20/23 occas hallucinations occas cant sleep  found mass in rt groin scaly lesion on scalp perhaps allergies  stopped escitalapram  usually sleeps 12 hours at night - naps 3-4 hours  3 times in 3-4 months had hallucinations  has OA and compression fractions had ankle fracture at [facility] then toe broken   6/6/23 now 89 pt not sleeping well agitated in the night hallucinations runny nose  "im 89 Im     ancient" "you live in [place]"  8/4/23 pt s/p stay at [facility] she had a uti there and got bactrim had some rehab but not really walking no more hallucinations occas asks for  to get 24 hour aide to get home PT  9/15/23 pt has been well no more hallucinating working with PT not able to walk much memory good speech is good  1/16/24 pt has a cough for couple of weeks poor appetite never used ear drops no fever ensure

## 2024-01-16 NOTE — REVIEW OF SYSTEMS
[Fever] : no fever [Eyesight Problems] : eyesight problems [Loss Of Hearing] : hearing loss [Cough] : cough [Joint Stiffness] : joint stiffness [Limb Weakness] : limb weakness [Difficulty Walking] : difficulty walking [Negative] : Endocrine

## 2024-01-16 NOTE — ASSESSMENT
[FreeTextEntry1] : cough check flu covid rsv panel chest xray crackles RLL  for now symptomatiic treatment

## 2024-01-17 LAB
ALBUMIN SERPL ELPH-MCNC: 3.8 G/DL
ALP BLD-CCNC: 101 U/L
ALT SERPL-CCNC: 14 U/L
ANION GAP SERPL CALC-SCNC: 14 MMOL/L
AST SERPL-CCNC: 21 U/L
BILIRUB SERPL-MCNC: 0.2 MG/DL
BUN SERPL-MCNC: 33 MG/DL
CALCIUM SERPL-MCNC: 9.1 MG/DL
CHLORIDE SERPL-SCNC: 101 MMOL/L
CO2 SERPL-SCNC: 24 MMOL/L
CREAT SERPL-MCNC: 0.99 MG/DL
EGFR: 50 ML/MIN/1.73M2
ESTIMATED AVERAGE GLUCOSE: 123 MG/DL
GLUCOSE SERPL-MCNC: 121 MG/DL
HBA1C MFR BLD HPLC: 5.9 %
HCT VFR BLD CALC: 37.2 %
HGB BLD-MCNC: 12.1 G/DL
MCHC RBC-ENTMCNC: 32.5 GM/DL
MCHC RBC-ENTMCNC: 33.6 PG
MCV RBC AUTO: 103.3 FL
PLATELET # BLD AUTO: 363 K/UL
POTASSIUM SERPL-SCNC: 5.4 MMOL/L
PROT SERPL-MCNC: 7.1 G/DL
RBC # BLD: 3.6 M/UL
RBC # FLD: 13.8 %
SODIUM SERPL-SCNC: 139 MMOL/L
WBC # FLD AUTO: 10.59 K/UL

## 2024-01-18 LAB
INFLUENZA A RESULT: DETECTED
INFLUENZA B RESULT: NOT DETECTED
RESP SYN VIRUS RESULT: NOT DETECTED
SARS-COV-2 RESULT: NOT DETECTED

## 2024-01-19 PROBLEM — R05.1 ACUTE COUGH: Status: ACTIVE | Noted: 2024-01-19

## 2024-01-24 ENCOUNTER — RESULT REVIEW (OUTPATIENT)
Age: 89
End: 2024-01-24

## 2024-01-30 ENCOUNTER — TRANSCRIPTION ENCOUNTER (OUTPATIENT)
Age: 89
End: 2024-01-30

## 2024-04-18 ENCOUNTER — APPOINTMENT (OUTPATIENT)
Dept: GERIATRICS | Facility: HOME HEALTH | Age: 89
End: 2024-04-18

## 2024-05-29 ENCOUNTER — APPOINTMENT (OUTPATIENT)
Dept: GERIATRICS | Facility: HOME HEALTH | Age: 89
End: 2024-05-29
Payer: MEDICARE

## 2024-05-29 VITALS
HEART RATE: 66 BPM | DIASTOLIC BLOOD PRESSURE: 70 MMHG | OXYGEN SATURATION: 97 % | TEMPERATURE: 96.4 F | SYSTOLIC BLOOD PRESSURE: 145 MMHG

## 2024-05-29 VITALS — WEIGHT: 78.8 LBS | BODY MASS INDEX: 17.05 KG/M2

## 2024-05-29 DIAGNOSIS — R79.89 OTHER SPECIFIED ABNORMAL FINDINGS OF BLOOD CHEMISTRY: ICD-10-CM

## 2024-05-29 DIAGNOSIS — E11.9 TYPE 2 DIABETES MELLITUS W/OUT COMPLICATIONS: ICD-10-CM

## 2024-05-29 DIAGNOSIS — R26.9 UNSPECIFIED ABNORMALITIES OF GAIT AND MOBILITY: ICD-10-CM

## 2024-05-29 DIAGNOSIS — I10 ESSENTIAL (PRIMARY) HYPERTENSION: ICD-10-CM

## 2024-05-29 PROCEDURE — 99349 HOME/RES VST EST MOD MDM 40: CPT

## 2024-05-29 RX ORDER — NIFEDIPINE 30 MG/1
30 TABLET, EXTENDED RELEASE ORAL
Qty: 90 | Refills: 3 | Status: ACTIVE | COMMUNITY
Start: 2021-07-27 | End: 1900-01-01

## 2024-05-29 RX ORDER — BENZONATATE 100 MG/1
100 CAPSULE ORAL TWICE DAILY
Qty: 60 | Refills: 3 | Status: DISCONTINUED | COMMUNITY
Start: 2024-01-19 | End: 2024-05-29

## 2024-05-29 RX ORDER — PANTOPRAZOLE 40 MG/1
40 TABLET, DELAYED RELEASE ORAL DAILY
Qty: 90 | Refills: 3 | Status: ACTIVE | COMMUNITY
Start: 2019-12-03 | End: 1900-01-01

## 2024-05-29 RX ORDER — LOSARTAN POTASSIUM 25 MG/1
25 TABLET, FILM COATED ORAL
Qty: 90 | Refills: 3 | Status: ACTIVE | COMMUNITY
Start: 2020-05-05 | End: 1900-01-01

## 2024-05-29 RX ORDER — MULTIVIT-MIN/IRON/FOLIC ACID/K 18-600-40
50 MCG CAPSULE ORAL
Qty: 90 | Refills: 3 | Status: ACTIVE | COMMUNITY
Start: 2024-05-29 | End: 1900-01-01

## 2024-05-29 RX ORDER — CETIRIZINE HYDROCHLORIDE 5 MG/1
5 TABLET ORAL DAILY
Qty: 30 | Refills: 3 | Status: DISCONTINUED | COMMUNITY
Start: 2024-01-03 | End: 2024-05-29

## 2024-05-29 NOTE — PHYSICAL EXAM
[Alert] : alert [Well Nourished] : well nourished [Well Developed] : well developed [Sclera] : the sclera and conjunctiva were normal [PERRL] : pupils were equal in size, round, and reactive to light [No Oral Pallor] : no oral pallor [No Oral Cyanosis] : no oral cyanosis [Oropharynx] : the oropharynx was normal [No Respiratory Distress] : no respiratory distress [Respiration, Rhythm And Depth] : normal respiratory rhythm and effort [Normal S1, S2] : normal S1 and S2 [Heart Rate And Rhythm] : heart rate was normal and rhythm regular [Edema] : edema was not present [External Female Genitalia] : normal external genitalia [Normal] : no focal deficits [No Focal Deficits] : no focal deficits [Normal Affect] : the affect was normal [Normal Mood] : the mood was normal [de-identified] : frail in chair- awake - conversant, knows birthday, hard to balance on scale [de-identified] : ears clear [de-identified] : clear [de-identified] : s1s2 rrr 3/6 systolic murmur [de-identified] : kyphosis

## 2024-05-29 NOTE — ASSESSMENT
[FreeTextEntry1] : repeat labs in 3 months renewed meds  called [facility] - await news on pneumonia vaccine  5/14/19 left ear cleaned of cerumen check labs refer to pain/acupuncture drs  9/17/19 pt going for MRI spine and hip films today from Dr Fernandez encouraged increase tylenol to 1000 mg tid labs fine in may - no need to repeat today hand exercises lives with grand daughter - a PT clipped toe nails cleaned ears gave flu vaccine  12/3/19 avoid nsaids repeat labs cont pantaprozole f/up 2 weeks  12/17/19 cont off DM meds recheck labs f.up feb if stable  2/25/20 checking labs today pain management for back  5/18/21 pt had episode of weakness - two weeks ago responded to IV fluids pt has loud systolic mumur - which is louder than last visit with sinus arrythmia will arrange cardiac eval  7/27/21 last few days BP in 140s will continue to monitor on same meds did not change today as going well with new cuff and meds  9/28/21 LE edema - dependent - needs to elevate lungs- clear BP better check labs flu vaccine     given cont same meds to get echo and cardiac check up  3/22/22 decrease metoprolol 25 bid (from 2 in am) doing PT and OT reviewed labs and gave  copy check BP at home alert, not depressed  6/7/22 [facility] - suggested amplifier going to grand daughters wedding pt states she is depressed - trying 5 mg lexapro check labs again r/o UTI  12/6/22 will check labs will check xrays ls spine and pelvis   4/20/23 pt is doing well very alert pam controlled will check labs at home will sono rt groin - mass is mobille - doubt malignant has meds trial 1/2 ativan if agitated keep  eye on scalp  6/6/23 trial 6.25 mg seroquel check urine ok right now  8/4/23 pt is just back from rehab she is frail unable to stand eats well no more hallucinations BP is wnl renewed meds to get home PT some back pain - tylenol and lidocaine patches  9/15/23 pt is doing better BP is good gave flu vaccine - high dose rt getting PT and OT ordered home labs no more hallucinations  5/29/24 89 yr old renewed BP meds check labs gets PT doing great

## 2024-05-29 NOTE — HISTORY OF PRESENT ILLNESS
[FreeTextEntry1] : pt is home from rehab after hospital admit with multiple fractures rt hand, shoulder arm, pelvis  she has made a great recovery at [place]  requests home pt and ot  [facility] on thursday  labs ordered  meds renewed  3/22/22  several episodes of acute confusion last weeek disoriented at times did not fall saw dots, noodles, people at various times once at 230 am  sometimes in the day had labs done at home - all wnl - except vit D 15 no UTI now fine four days eats v well not dizzy does not check BP at home  pt is alert can read my badge aware of current events knows re risk of WW3 reads paper  6/7/22 getting OT and Pt reluctant to do exercises on her own may have had blood in stool one day  eats a lot of tomato sauce urinates a lot gets confused - thought she saw something on rug black spots thought people at the door sometimes talks to people not there  12/6/22 [place] was in [place] - agitated in hospital then went to [facility] in [facility] for rehab 8 days ago fell in home tried to get home xrays - but unable poo appetite needs home pt and aide  4/20/23 occas hallucinations occas cant sleep  found mass in rt groin scaly lesion on scalp perhaps allergies  stopped escitalapram  usually sleeps 12 hours at night - naps 3-4 hours  3 times in 3-4 months had hallucinations  has OA and compression fractions had ankle fracture at [facility] then toe broken   6/6/23 now 89 pt not sleeping well agitated in the night hallucinations runny nose  "im 89 Im     ancient" "you live in [place]"  8/4/23 pt s/p stay at [facility] she had a uti there and got bactrim had some rehab but not really walking no more hallucinations occas asks for  to get 24 hour aide to get home PT  9/15/23 pt has been well no more hallucinating working with PT not able to walk much memory good speech is good  1/16/24 pt has a cough for couple of weeks poor appetite never used ear drops no fever ensure  5/29/24 pt is now 89 alert in her wheel chair appears well eats fairly well BP good on 40 mg pantaporzole

## 2024-05-29 NOTE — REVIEW OF SYSTEMS
[Loss Of Hearing] : hearing loss [Diarrhea] : diarrhea [Incontinence] : incontinence [Arthralgias] : arthralgias [Confused] : confusion [Limb Weakness] : limb weakness [Difficulty Walking] : difficulty walking [Negative] : Heme/Lymph [FreeTextEntry7] : loose BM with tomato sauce

## 2024-05-30 ENCOUNTER — LABORATORY RESULT (OUTPATIENT)
Age: 89
End: 2024-05-30

## 2024-05-31 ENCOUNTER — LABORATORY RESULT (OUTPATIENT)
Age: 89
End: 2024-05-31

## 2024-08-14 ENCOUNTER — APPOINTMENT (OUTPATIENT)
Dept: GERIATRICS | Facility: HOME HEALTH | Age: 89
End: 2024-08-14

## 2024-10-08 ENCOUNTER — APPOINTMENT (OUTPATIENT)
Dept: GERIATRICS | Facility: CLINIC | Age: 89
End: 2024-10-08
Payer: MEDICARE

## 2024-10-08 ENCOUNTER — LABORATORY RESULT (OUTPATIENT)
Age: 89
End: 2024-10-08

## 2024-10-08 VITALS — SYSTOLIC BLOOD PRESSURE: 118 MMHG | HEART RATE: 77 BPM | DIASTOLIC BLOOD PRESSURE: 68 MMHG | OXYGEN SATURATION: 98 %

## 2024-10-08 DIAGNOSIS — R53.83 OTHER FATIGUE: ICD-10-CM

## 2024-10-08 PROCEDURE — 99213 OFFICE O/P EST LOW 20 MIN: CPT

## 2024-10-09 LAB
ALBUMIN SERPL ELPH-MCNC: 4.1 G/DL
ALP BLD-CCNC: 97 U/L
ALT SERPL-CCNC: 14 U/L
ANION GAP SERPL CALC-SCNC: 15 MMOL/L
APPEARANCE: CLEAR
AST SERPL-CCNC: 21 U/L
BASOPHILS # BLD AUTO: 0.06 K/UL
BASOPHILS NFR BLD AUTO: 0.6 %
BILIRUB SERPL-MCNC: 0.2 MG/DL
BILIRUBIN URINE: NEGATIVE
BLOOD URINE: NEGATIVE
BUN SERPL-MCNC: 29 MG/DL
CALCIUM SERPL-MCNC: 9.1 MG/DL
CHLORIDE SERPL-SCNC: 100 MMOL/L
CO2 SERPL-SCNC: 24 MMOL/L
COLOR: YELLOW
CREAT SERPL-MCNC: 0.84 MG/DL
EGFR: 60 ML/MIN/1.73M2
EOSINOPHIL # BLD AUTO: 0.08 K/UL
EOSINOPHIL NFR BLD AUTO: 0.8 %
GLUCOSE QUALITATIVE U: NEGATIVE MG/DL
GLUCOSE SERPL-MCNC: 152 MG/DL
HCT VFR BLD CALC: 37.9 %
HGB BLD-MCNC: 11.9 G/DL
IMM GRANULOCYTES NFR BLD AUTO: 0.9 %
IRON SATN MFR SERPL: 33 %
IRON SERPL-MCNC: 85 UG/DL
KETONES URINE: NEGATIVE MG/DL
LEUKOCYTE ESTERASE URINE: NEGATIVE
LYMPHOCYTES # BLD AUTO: 3.79 K/UL
LYMPHOCYTES NFR BLD AUTO: 37.3 %
MAN DIFF?: NORMAL
MCHC RBC-ENTMCNC: 31.4 GM/DL
MCHC RBC-ENTMCNC: 33 PG
MCV RBC AUTO: 105 FL
MONOCYTES # BLD AUTO: 0.73 K/UL
MONOCYTES NFR BLD AUTO: 7.2 %
NEUTROPHILS # BLD AUTO: 5.42 K/UL
NEUTROPHILS NFR BLD AUTO: 53.2 %
NITRITE URINE: NEGATIVE
PH URINE: 5.5
PLATELET # BLD AUTO: 347 K/UL
POTASSIUM SERPL-SCNC: 4.1 MMOL/L
PROT SERPL-MCNC: 7.1 G/DL
PROTEIN URINE: 30 MG/DL
RBC # BLD: 3.61 M/UL
RBC # FLD: 14.4 %
SODIUM SERPL-SCNC: 139 MMOL/L
SPECIFIC GRAVITY URINE: 1.02
TIBC SERPL-MCNC: 256 UG/DL
UIBC SERPL-MCNC: 171 UG/DL
UROBILINOGEN URINE: 0.2 MG/DL
WBC # FLD AUTO: 10.17 K/UL

## 2024-10-16 ENCOUNTER — APPOINTMENT (OUTPATIENT)
Dept: GERIATRICS | Facility: HOME HEALTH | Age: 89
End: 2024-10-16
Payer: MEDICARE

## 2024-10-16 VITALS — DIASTOLIC BLOOD PRESSURE: 70 MMHG | HEART RATE: 67 BPM | SYSTOLIC BLOOD PRESSURE: 165 MMHG | OXYGEN SATURATION: 96 %

## 2024-10-16 DIAGNOSIS — K62.5 HEMORRHAGE OF ANUS AND RECTUM: ICD-10-CM

## 2024-10-16 DIAGNOSIS — E11.9 TYPE 2 DIABETES MELLITUS W/OUT COMPLICATIONS: ICD-10-CM

## 2024-10-16 DIAGNOSIS — I10 ESSENTIAL (PRIMARY) HYPERTENSION: ICD-10-CM

## 2024-10-16 DIAGNOSIS — K92.1 MELENA: ICD-10-CM

## 2024-10-16 PROCEDURE — 99349 HOME/RES VST EST MOD MDM 40: CPT

## 2024-11-05 ENCOUNTER — TRANSCRIPTION ENCOUNTER (OUTPATIENT)
Age: 89
End: 2024-11-05

## 2024-11-07 ENCOUNTER — NON-APPOINTMENT (OUTPATIENT)
Age: 89
End: 2024-11-07

## 2024-12-18 ENCOUNTER — APPOINTMENT (OUTPATIENT)
Dept: GERIATRICS | Facility: HOME HEALTH | Age: 88
End: 2024-12-18

## 2025-01-16 ENCOUNTER — APPOINTMENT (OUTPATIENT)
Dept: GERIATRICS | Facility: HOME HEALTH | Age: 89
End: 2025-01-16
Payer: MEDICARE

## 2025-01-16 VITALS
HEART RATE: 83 BPM | SYSTOLIC BLOOD PRESSURE: 130 MMHG | TEMPERATURE: 97 F | OXYGEN SATURATION: 98 % | DIASTOLIC BLOOD PRESSURE: 70 MMHG

## 2025-01-16 DIAGNOSIS — R53.83 OTHER FATIGUE: ICD-10-CM

## 2025-01-16 DIAGNOSIS — I10 ESSENTIAL (PRIMARY) HYPERTENSION: ICD-10-CM

## 2025-01-16 DIAGNOSIS — R26.9 UNSPECIFIED ABNORMALITIES OF GAIT AND MOBILITY: ICD-10-CM

## 2025-01-16 DIAGNOSIS — R63.4 ABNORMAL WEIGHT LOSS: ICD-10-CM

## 2025-01-16 PROCEDURE — 99349 HOME/RES VST EST MOD MDM 40: CPT

## 2025-01-17 ENCOUNTER — LABORATORY RESULT (OUTPATIENT)
Age: 89
End: 2025-01-17

## 2025-01-20 ENCOUNTER — LABORATORY RESULT (OUTPATIENT)
Age: 89
End: 2025-01-20

## 2025-01-21 ENCOUNTER — LABORATORY RESULT (OUTPATIENT)
Age: 89
End: 2025-01-21

## 2025-03-26 ENCOUNTER — APPOINTMENT (OUTPATIENT)
Dept: GERIATRICS | Facility: HOME HEALTH | Age: 89
End: 2025-03-26
Payer: MEDICARE

## 2025-03-26 VITALS — HEART RATE: 76 BPM | OXYGEN SATURATION: 96 % | DIASTOLIC BLOOD PRESSURE: 70 MMHG | SYSTOLIC BLOOD PRESSURE: 140 MMHG

## 2025-03-26 PROCEDURE — 99349 HOME/RES VST EST MOD MDM 40: CPT

## 2025-03-27 ENCOUNTER — LABORATORY RESULT (OUTPATIENT)
Age: 89
End: 2025-03-27

## 2025-05-22 ENCOUNTER — APPOINTMENT (OUTPATIENT)
Dept: GERIATRICS | Facility: HOME HEALTH | Age: 89
End: 2025-05-22
Payer: MEDICARE

## 2025-05-22 VITALS — OXYGEN SATURATION: 96 % | DIASTOLIC BLOOD PRESSURE: 70 MMHG | HEART RATE: 67 BPM | SYSTOLIC BLOOD PRESSURE: 122 MMHG

## 2025-05-22 DIAGNOSIS — F32.A ANXIETY DISORDER, UNSPECIFIED: ICD-10-CM

## 2025-05-22 DIAGNOSIS — I10 ESSENTIAL (PRIMARY) HYPERTENSION: ICD-10-CM

## 2025-05-22 DIAGNOSIS — E11.9 TYPE 2 DIABETES MELLITUS W/OUT COMPLICATIONS: ICD-10-CM

## 2025-05-22 DIAGNOSIS — F41.9 ANXIETY DISORDER, UNSPECIFIED: ICD-10-CM

## 2025-05-22 PROCEDURE — 99349 HOME/RES VST EST MOD MDM 40: CPT

## 2025-07-24 ENCOUNTER — APPOINTMENT (OUTPATIENT)
Dept: GERIATRICS | Facility: HOME HEALTH | Age: 89
End: 2025-07-24